# Patient Record
Sex: FEMALE | Race: BLACK OR AFRICAN AMERICAN | Employment: UNEMPLOYED | URBAN - METROPOLITAN AREA
[De-identification: names, ages, dates, MRNs, and addresses within clinical notes are randomized per-mention and may not be internally consistent; named-entity substitution may affect disease eponyms.]

---

## 2021-09-24 ENCOUNTER — HOSPITAL ENCOUNTER (OUTPATIENT)
Dept: PHYSICAL THERAPY | Age: 66
Discharge: HOME | End: 2021-09-24
Payer: MEDICARE

## 2021-09-24 PROCEDURE — 97162 PT EVAL MOD COMPLEX 30 MIN: CPT

## 2021-09-24 NOTE — PROGRESS NOTES
PHYSICAL THERAPY - DAILY TREATMENT NOTE    Patient Name: Theresa Sullivan        Date: 2021  : 1955   YES Patient  Verified  Visit #:      16  Insurance: Payor: [place] MEDICARE / Plan: VA MEDICARE PART A & B / Product Type: Medicare /      In time: 8:30 Out time: 9:30   Total Treatment Time: 60     Medicare/BCBS Time Tracking (below)   Total Timed Codes (min):  5 1:1 Treatment Time:  55     TREATMENT AREA =  Neck pain [M54.2]  Low back pain [M54.5]    SUBJECTIVE    Pain Level (on 0 to 10 scale):   10   Medication Changes/New allergies or changes in medical history, any new surgeries or procedures? NO    If yes, update Summary List   Subjective Functional Status/Changes:  []  No changes reported     See POC          OBJECTIVE    5 (NB) min Self Care: Reviewed diagnosis, prognosis, therapy progression   Rationale:    Improve understanding of injury and therapy to have realistic expectation of therapy to improve compliance/adherence and satisfaction    Billed With/As:   [] TE   [] TA   [] Neuro   [x] Self Care Patient Education: [x] Review HEP    [] Progressed/Changed HEP based on:   [x] Addressed barriers and behaviors     [] Therapeutic Neuroscience Pain Education, metaphor, reframing, contexts.     [x] Sleep Education   [] Body Mechanics [x] Healing Timeframe     [] Self STM with ball at \"the spot\"  [x] Walking Program/Global Activity   [] other:         Other Objective/Functional Measures:    See POC note     Post Treatment Pain Level (on 0 to 10) scale:   5 / 10     ASSESSMENT  Assessment/Changes in Function:     See POC     []  See Progress Note/Recertification   Patient will continue to benefit from skilled PT services to modify and progress therapeutic interventions, address functional mobility deficits, address ROM deficits, address strength deficits, analyze and address soft tissue restrictions, analyze and cue movement patterns, analyze and modify body mechanics/ergonomics and assess and modify postural abnormalities to attain goals per POC. Progress toward goals / Updated goals:    Pt evaluated today.  See POC     PLAN  [x]  Upgrade activities as tolerated YES Continue plan of care   []  Discharge due to :    []  Other:      Therapist: Leonor Gray, PT, DPT, OCS    Date: 9/24/2021 Time: 12:15 PM

## 2021-09-24 NOTE — PROGRESS NOTES
[address] [place] [address] PHYSICAL THERAPY   [facility] [address],[facility], [facility] - Phone: [phone]  Fax: [phone] [facility] / [address] [facility]  Patient Name: Ladarius Cody : 1955   Medical   Diagnosis: Neck pain [M54.2]  Low back pain [M54.5] Treatment Diagnosis: Neck pain [M54.2]  Low back pain [M54.5]   Onset Date: Lifetime. Worsening since May 2021     Referral Source: Marquez Gomez MD Start of Care [facility]): 2021   Prior Hospitalization: See medical history Provider #: [id number]   Prior Level of Function: Walking some, in [address] mostly. Pain and poor function   Comorbidities: Allergies, Anxiety/Panic, arthritis, back pain, depression, DMtype2, HA, HTN, osteoporosis, seizure, TIA, visual impairment. Medications: Verified on Patient Summary List   The Plan of Care and following information is based on the information from the initial evaluation.   ===========================================================================================  Subjective: Reports she has lived with pain her entire life. The entire back side of the body hurts. She had a fall in May 2021 and her body just gave out. Did not hurt herself, but hasn't stood since then. In a WC. Fear of falling. This happened last year too. Did not walk from November to January in 2019 or so. He legs are weak and she has no balance. Says has been in pain in the arms at night, from the neck down. History of PTSD, insest, emotional stress, and chronic pain. Was told her pain and weakness is in her head. Wishes to do Aquatic Therapy because she has done it in the past. She has low back pain and knee pain as well. She spends most of her day lying down. Wants to get better balance and walk. When asked about seizure, pt reports she has had one, but doesn't know the frequency.      Assessment / key information:  [facility]. with signs and symptoms congruent with a diagnosis of chronic persistent pain located at the neck, back and knees primarily. Pt has weakness, poor standing tolerance, poor balance. This affects her QOL and function. Plan will be to improve standing tolerance, stepping tolerance and move towards stair negotiation towards being able to get in and out of the pool to work on pain control therapy. Both parties are agreed to [address] [facility] [address], pt appears motivated for progress. Patient would benefit from skilled intervention to address the above deficits, improve quality of life and return patient to maximum level of prior function. OBJECTIVE: Presenting in manual WC with bilat foot props. LE strength is 3+ on MMT for the knees and hip flexion. UE strength is minimal, yet functional to rise from the chair. Standing from the [facility] is min asssit x1 with set-up required, slow, pt unable to reach full ext of the lumbopelvic area. LEs locked in extension once standing, use of arms on 2WW for support. LBP with duration. Pt can not take a step, per exam.     HEP: Access Code: [email or web address]  URL: [email or web address]  Date: 09/24/2021 Prepared by: Saige Rm    Exercises  Seated Shoulder Flexion Full Range - 2 x daily - 7 x weekly - 1 sets - 10 reps  Seated Cervical Rotation AROM - 1 x daily - 7 x weekly - 1 sets - 10 reps  Standing Backward Shoulder Rolls - 1 x daily - 7 x weekly - 1 sets - 10 reps  Chair Press Ups Forward and Backward - 1 x daily - 7 x weekly - 1 sets - 10 reps  Seated Long Arc Quad - 1 x daily - 7 x weekly - 3 sets - 10 reps  Seated Heel Raise - 1 x daily - 7 x weekly - 3 sets - 10 reps  Seated March - 1 x daily - 7 x weekly - 2 sets - 10 reps    ===========================================================================================  Eval Complexity: History HIGH Complexity :3+ comorbidities / personal factors will impact the outcome/ POC ;  Examination  MEDIUM Complexity : 3 Standardized tests and measures addressing body structure, function, activity limitation and / or participation in recreation ; Presentation HIGH Complexity : Unstable and unpredictable characteristics ; Decision Making MEDIUM Complexity : FOTO score of 26-74; Overall Complexity MEDIUM  Problem List: pain affecting function, decrease ROM, decrease strength, impaired gait/ balance, decrease ADL/ functional abilitiies, decrease activity tolerance and decrease transfer abilities   Treatment Plan may include any combination of the following: Therapeutic exercise, Therapeutic activities, Neuromuscular re-education, Physical agent/modality, Gait/balance training, Manual therapy, Aquatic therapy, Patient education, Self Care training and Functional mobility training  Patient / Family readiness to learn indicated by: asking questions, trying to perform skills and interest  Persons(s) to be included in education: patient (P)  Barriers to Learning/Limitations: None  Measures taken, if barriers to learning:    Patient Goal (s): To walk again   Patient self reported health status: fair  Rehabilitation Potential: fair   Short Term Goals: To be accomplished in  4  weeks  STG1 Pt to report adherence and demonstrate compliance with basic HEP to allow progress between visits  STG2 Pt to report pain <5/10 at worst to allow improved function and QOL  STG3 Pt to sit-stand indep from slight elevated surface to indicate improve home independence. STG4 Pt to stand for 4 minutes with LRAD and intermittent use of 1 UE to indicate ability to stand and complete an ADL in the home   Long Term Goals: To be accomplished in  8  weeks  LTG1 Pt to improve FOTO score to 43+/100 indicating improved function in daily tasks  LTG2 Pt to demonstrate ability to walk with walker for 150 ft to indicate home ambulation status  LTG3 Pt to demonstrate ascend/descend set of 3 stairs to allow access to pool therapy for pain control.     Frequency / Duration: Patient to be seen  2  times per week for 8  weeks  Patient / Caregiver education and instruction: self care and exercises  Therapist Signature: Anayeli Martines PT Date: 1/29/0906   Certification Period: 9/24/21 - 12/23/21 Time: 8:32 AM   ===========================================================================================  I certify that the above Physical Therapy Services are being furnished while the patient is under my care. I agree with the treatment plan and certify that this therapy is necessary. Physician Signature:        Date:       Time:                                        Jose R Valderrama MD  Please sign and return to [facility] AT [facility], [facility]. or you may fax the signed copy to [phone]. Thank you. MEDICAID  To ensure your patient receives the highest quality care and to avoid disruption in therapy please sign and return this plan of care within 21 days. Per Medicaid guidelines if the plan of care is not received within 21 days the patient's care must be put on hold until signed.

## 2021-10-07 ENCOUNTER — HOSPITAL ENCOUNTER (OUTPATIENT)
Dept: PHYSICAL THERAPY | Age: 66
Discharge: HOME | End: 2021-10-07
Payer: MEDICARE

## 2021-10-07 PROCEDURE — 97530 THERAPEUTIC ACTIVITIES: CPT

## 2021-10-07 PROCEDURE — 97535 SELF CARE MNGMENT TRAINING: CPT

## 2021-10-07 PROCEDURE — 97112 NEUROMUSCULAR REEDUCATION: CPT

## 2021-10-07 NOTE — PROGRESS NOTES
PHYSICAL THERAPY - DAILY TREATMENT NOTE    Patient Name: Carol Roland        Date: 10/7/2021  : 1955   yes Patient  Verified  Visit #:   2   of   16  Insurance: Payor: VA MEDICARE / Plan: VA MEDICARE PART A & B / Product Type: Medicare /      In time: 3419 Out time: 100   Total Treatment Time: 45     Medicare/BCBS Time Tracking (below)   Total Timed Codes (min):  45 1:1 Treatment Time:  45     TREATMENT AREA =  Neck pain [M54.2]  Low back pain [M54.50]    SUBJECTIVE  Pain Level (on 0 to 10 scale):  7  / 10   Medication Changes/New allergies or changes in medical history, any new surgeries or procedures?    no  If yes, update Summary List   Subjective Functional Status/Changes:  []  No changes reported     I fell in the bathroom last week trying t get up on my own. OBJECTIVE  15 min Therapeutic Activity:  [x]  See flow sheet   Rationale:      increase ROM, increase strength, improve coordination and improve balance to improve the patients ability to perform functional mobility activities and perform general ADLs with decrease c/o symptoms     22 min Neuromuscular Re-ed: See floe sheet. Rationale:      decrease pain, increase ROM and increase tissue extensibility to improve patient's ability to perform functional mobility activities and perform general ADLs with decrease c/o symptoms  The manual therapy interventions were performed at a separate and distinct time from the therapeutic activities interventions. 8 min Self Care:  Patient educated in spinal stenosis of L4/5 and L5/S1 region.    Rationale:      increase ROM, increase strength, improve coordination and improve balance to improve the patients ability to perform functional mobility activities and perform general ADLs with decrease c/o symptoms     Billed With/As:   [x] TE   [] TA   [] Neuro   [] Self Care Patient Education: [x] Review HEP    [] Progressed/Changed HEP based on:   [] positioning   [] body mechanics   [] transfers [] heat/ice application    [] other:      Other Objective/Functional Measures:    No change in functional measurements today     Post Treatment Pain Level (on 0 to 10) scale:   5  / 10     ASSESSMENT  Assessment/Changes in Function:     Overall good tolerance to all therapeutic interventions for first treatment session. Patient was able to perform standing activities in parallel bars with overall good tolerance and LE strength. No signs of LEs buckling with standing or weight shifting. Use of gait belt for patient safety, but no assistance required for sit <> stand. []  See Progress Note/Recertification   Patient will continue to benefit from skilled PT services to modify and progress therapeutic interventions, address functional mobility deficits, address ROM deficits, address strength deficits, analyze and address soft tissue restrictions and analyze and cue movement patterns to attain remaining goals. Progress toward goals / Updated goals: · Short Term Goals: To be accomplished in  4  weeks  STG1 Pt to report adherence and demonstrate compliance with basic HEP to allow progress between visits  STG2 Pt to report pain <5/10 at worst to allow improved function and QOL  STG3 Pt to sit-stand indep from slight elevated surface to indicate improve home independence. STG4 Pt to stand for 4 minutes with LRAD and intermittent use of 1 UE to indicate ability to stand and complete an ADL in the home  · Long Term Goals: To be accomplished in  8  weeks  LTG1 Pt to improve FOTO score to 43+/100 indicating improved function in daily tasks  LTG2 Pt to demonstrate ability to walk with walker for 150 ft to indicate home ambulation status  LTG3 Pt to demonstrate ascend/descend set of 3 stairs to allow access to pool therapy for pain control.        PLAN  []  Upgrade activities as tolerated yes Continue plan of care   []  Discharge due to :    []  Other:      Therapist: Som Samano PTA    Date: 10/7/2021 Time: 7:20 AM     Future Appointments   Date Time Provider Skylar Vaishali   10/7/2021 12:15 PM Betina Jordanon [address] [facility]   10/13/2021 12:15 PM Betina Rosario [address] [facility]   10/15/2021 12:45 PM Álvaroe Ainsleying,  [address] [facility]   10/19/2021  3:00 PM Donalee Ainsleying,  [address] [facility]   10/21/2021  3:00 PM Álvaroe Ainsleying,  [address] [facility]   10/25/2021  1:45 PM Cachorro Griggs,  [address] [facility]   10/27/2021 12:45 PM Mickey George, PT [id number]

## 2021-10-13 ENCOUNTER — HOSPITAL ENCOUNTER (OUTPATIENT)
Dept: PHYSICAL THERAPY | Age: 66
Discharge: HOME | End: 2021-10-13
Payer: MEDICARE

## 2021-10-13 PROCEDURE — 97110 THERAPEUTIC EXERCISES: CPT

## 2021-10-13 PROCEDURE — 97112 NEUROMUSCULAR REEDUCATION: CPT

## 2021-10-13 PROCEDURE — 97530 THERAPEUTIC ACTIVITIES: CPT

## 2021-10-13 NOTE — PROGRESS NOTES
PHYSICAL THERAPY - DAILY TREATMENT NOTE    Patient Name: Jaz Graham        Date: 10/13/2021  : 1955   yes Patient  Verified  Visit #:   3   of   16  Insurance: Payor: [employer]IC[employer] / Plan: [employer] / Product Type: Managed Care Medicare /      In time: 70 Out time: 100   Total Treatment Time: 45     Medicare/BCBS Time Tracking (below)   Total Timed Codes (min):  45 1:1 Treatment Time:  45     TREATMENT AREA =  Neck pain [M54.2]  Low back pain [M54.50]    SUBJECTIVE  Pain Level (on 0 to 10 scale):  5  / 10   Medication Changes/New allergies or changes in medical history, any new surgeries or procedures?    no  If yes, update Summary List   Subjective Functional Status/Changes:  []  No changes reported     Reports feeling very heavy and tired after last treatment session.           OBJECTIVE  15 min Therapeutic Exercise:  [x]  See flow sheet   Rationale:      increase ROM, increase strength, improve coordination and improve balance to improve the patients ability to perform functional mobility activities and perform general ADLs with decrease c/o symptoms      15 min Therapeutic Activity: [x]  See flow sheet   Rationale:    increase ROM, increase strength, improve coordination and improve balance to improve the patients ability to perform functional mobility activities and perform general ADLs with decrease c/o symptoms     15 min Neuromuscular Re-ed: [x]  See flow sheet   Rationale:    increase ROM, increase strength, improve coordination and improve balance to improve the patients ability to perform functional mobility activities and perform general ADLs with decrease c/o symptoms       Billed With/As:   [x] TE   [] TA   [] Neuro   [] Self Care Patient Education: [x] Review HEP    [] Progressed/Changed HEP based on:   [] positioning   [] body mechanics   [] transfers   [] heat/ice application    [] other:      Other Objective/Functional Measures:    Increase level of therx to improve strength of (B) LE to standing tolerance. Post Treatment Pain Level (on 0 to 10) scale:   4  / 10     ASSESSMENT  Assessment/Changes in Function:     Good tolerance to increase of activity levels in parallel bars. vcing for bringing weight forward over LEs while performing sit <> stand. Able to perform with improved technique and returned good demonstration with decrease use of UEs to pull up.     []  See Progress Note/Recertification   Patient will continue to benefit from skilled PT services to modify and progress therapeutic interventions, address functional mobility deficits, address ROM deficits, address strength deficits and analyze and cue movement patterns to attain remaining goals. Progress toward goals / Updated goals: · Short Term Goals: To be accomplished in  4  weeks  STG1 Pt to report adherence and demonstrate compliance with basic HEP to allow progress between visits  STG2 Pt to report pain <5/10 at worst to allow improved function and QOL  STG3 Pt to sit-stand indep from slight elevated surface to indicate improve home independence. STG4 Pt to stand for 4 minutes with LRAD and intermittent use of 1 UE to indicate ability to stand and complete an ADL in the home  · Long Term Goals: To be accomplished in  8  weeks  LTG1 Pt to improve FOTO score to 43+/100 indicating improved function in daily tasks  LTG2 Pt to demonstrate ability to walk with walker for 150 ft to indicate home ambulation status  LTG3 Pt to demonstrate ascend/descend set of 3 stairs to allow access to pool therapy for pain control.        PLAN  []  Upgrade activities as tolerated yes Continue plan of care   []  Discharge due to :    []  Other:      Therapist: Norma Kinney PTA    Date: 10/13/2021 Time: 6:57 AM     Future Appointments   Date Time Provider Skylar Tom   10/13/2021 12:15 PM Sahara Gill [facility]   10/15/2021 12:45 PM Ana Cristina George PT [facility]   10/19/2021  3:00 PM Cachorro Griggs, PT [facility]   10/21/2021  3:00 PM Cachorro Griggs, PT [facility]   10/25/2021  1:45 PM Cachorro Griggs, PT [facility]   10/27/2021 12:45 PM Mickey George [place], PT [id number]

## 2021-10-15 ENCOUNTER — HOSPITAL ENCOUNTER (OUTPATIENT)
Dept: PHYSICAL THERAPY | Age: 66
Discharge: HOME | End: 2021-10-15
Payer: MEDICARE

## 2021-10-15 PROCEDURE — 97530 THERAPEUTIC ACTIVITIES: CPT

## 2021-10-15 PROCEDURE — 97110 THERAPEUTIC EXERCISES: CPT

## 2021-10-15 PROCEDURE — 97112 NEUROMUSCULAR REEDUCATION: CPT

## 2021-10-15 NOTE — PROGRESS NOTES
PHYSICAL THERAPY - DAILY TREATMENT NOTE    Patient Name: Tyler Moura        Date: 10/15/2021  : 1955   yes Patient  Verified  Visit #:   4   of   16  Insurance: Payor: [employer] MEDICARE / Plan: [address] / Product Type: Managed Care Medicare /      In time: 12:54 Out time: 1:40   Total Treatment Time: 46     Medicare/BCBS Time Tracking (below)   Total Timed Codes (min):  46 1:1 Treatment Time:  46     TREATMENT AREA =  Neck pain [M54.2]  Low back pain [M54.50]    SUBJECTIVE  Pain Level (on 0 to 10 scale):  8  / 10   Medication Changes/New allergies or changes in medical history, any new surgeries or procedures?    no  If yes, update Summary List   Subjective Functional Status/Changes:  []  No changes reported     Says tired and sore. Her body hurts in the evenings and she has trouble sleeping, neck and arm pain.         OBJECTIVE  15 min Therapeutic Exercise:  [x]  See flow sheet   Rationale:      increase ROM, increase strength, improve coordination and improve balance to improve the patients ability to perform functional mobility activities and perform general ADLs with decrease c/o symptoms      16 min Therapeutic Activity: [x]  See flow sheet   Rationale:    increase ROM, increase strength, improve coordination and improve balance to improve the patients ability to perform functional mobility activities and perform general ADLs with decrease c/o symptoms     15 min Neuromuscular Re-ed: [x]  See flow sheet   Rationale:    increase ROM, increase strength, improve coordination and improve balance to improve the patients ability to perform functional mobility activities and perform general ADLs with decrease c/o symptoms       Billed With/As:   [x] TE   [] TA   [] Neuro   [] Self Care Patient Education: [x] Review HEP    [] Progressed/Changed HEP based on:   [] positioning   [] body mechanics   [] transfers   [] heat/ice application    [] other:      Other Objective/Functional Measures:    Standing in //bars: 1', 3', 6'30\"     Post Treatment Pain Level (on 0 to 10) scale:   5 / 10     ASSESSMENT  Assessment/Changes in Function:     Pt presenting with low mood upon entry to clinic and good mood post session. Able to work standing time increases today, third attempt at 6 min 30 sec. Added weight shift with heel lift (prance-in-place) with good result and instructed pt for home use of this drills. Pt continues to have fear of falling behavior. This was verbally addressed and will continue POC as able. []  See Progress Note/Recertification   Patient will continue to benefit from skilled PT services to modify and progress therapeutic interventions, address functional mobility deficits, address ROM deficits, address strength deficits and analyze and cue movement patterns to attain remaining goals. Progress toward goals / Updated goals: · Short Term Goals: To be accomplished in  4  weeks  STG1 Pt to report adherence and demonstrate compliance with basic HEP to allow progress between visits  STG2 Pt to report pain <5/10 at worst to allow improved function and QOL  STG3 Pt to sit-stand indep from slight elevated surface to indicate improve home independence. STG4 Pt to stand for 4 minutes with LRAD and intermittent use of 1 UE to indicate ability to stand and complete an ADL in the home  · Long Term Goals: To be accomplished in  8  weeks  LTG1 Pt to improve FOTO score to 43+/100 indicating improved function in daily tasks  LTG2 Pt to demonstrate ability to walk with walker for 150 ft to indicate home ambulation status  LTG3 Pt to demonstrate ascend/descend set of 3 stairs to allow access to pool therapy for pain control.        PLAN  []  Upgrade activities as tolerated yes Continue plan of care   []  Discharge due to :    []  Other:      Therapist: Gene Terrazas, PT    Date: 10/15/2021 Time: 6:57 AM     Future Appointments   Date Time Provider Skylar Tom 10/19/2021  3:00 PM Hamilton Sanders, PT  [facility]   10/21/2021  3:00 PM Hamilton Sanders, PT  [facility]   10/25/2021  1:45 PM Hamilton Sanders, PT  [facility]   10/27/2021 12:45 PM Curly George, PT [id number]

## 2021-10-19 ENCOUNTER — HOSPITAL ENCOUNTER (OUTPATIENT)
Dept: PHYSICAL THERAPY | Age: 66
Discharge: HOME | End: 2021-10-19
Payer: MEDICARE

## 2021-10-19 PROCEDURE — 97110 THERAPEUTIC EXERCISES: CPT

## 2021-10-19 PROCEDURE — 97116 GAIT TRAINING THERAPY: CPT

## 2021-10-19 PROCEDURE — 97530 THERAPEUTIC ACTIVITIES: CPT

## 2021-10-19 NOTE — PROGRESS NOTES
PHYSICAL THERAPY - DAILY TREATMENT NOTE    Patient Name: China Singh        Date: 10/19/2021  : 1955   yes Patient  Verified  Visit #:   5   of   16  Insurance: Payor: [employer] MEDICARE / Plan: [address] / Product Type: Managed Care Medicare /      In time: 3:00 Out time: 3:40   Total Treatment Time: 40     Medicare/[facility] Time Tracking (below)   Total Timed Codes (min):  40 1:1 Treatment Time:  40     TREATMENT AREA =  Neck pain [M54.2]  Low back pain [M54.50]    SUBJECTIVE  Pain Level (on 0 to 10 scale):  5  / 10   Medication Changes/New allergies or changes in medical history, any new surgeries or procedures?    no  If yes, update Summary List   Subjective Functional Status/Changes:  []  No changes reported     Says she did fall out of her chair while doing. Home chores. Says she is wondering why the knee is hurting. She reports sore after last session, in the muscles. OBJECTIVE  15 min Therapeutic Exercise:  [x]  See flow sheet   Rationale:      increase ROM, increase strength, improve coordination and improve balance to improve the patients ability to perform functional mobility activities and perform general ADLs with decrease c/o symptoms      16 min Therapeutic Activity: [x]  See flow sheet   Rationale:    increase ROM, increase strength, improve coordination and improve balance to improve the patients ability to perform functional mobility activities and perform general ADLs with decrease c/o symptoms     10 min Gait Trainin feet with //barsdevice on level surfaces with CGA level of assistance, WC follwing, gaitbelt. Rationale: To improve ambulation safety and efficiency in order to improve patient's ability to safely ambulate at home for self care.         Billed With/As:   [x] TE   [] TA   [] Neuro   [] Self Care Patient Education: [x] Review HEP    [] Progressed/Changed HEP based on:   [] positioning   [] body mechanics   [] transfers   [] heat/ice application    [] other:      Other Objective/Functional Measures:    Stood with alt UE reaches. Walking in //bars, coaching for step length. Pt using 2 UE and rigid stepping, but safe. Post Treatment Pain Level (on 0 to 10) scale:   5 / 10     ASSESSMENT  Assessment/Changes in Function:     Able to progress standing and able to walk in clinic today with coaching. Continue POC. []  See Progress Note/Recertification   Patient will continue to benefit from skilled PT services to modify and progress therapeutic interventions, address functional mobility deficits, address ROM deficits, address strength deficits and analyze and cue movement patterns to attain remaining goals. Progress toward goals / Updated goals: · Short Term Goals: To be accomplished in  4  weeks  STG1 Pt to report adherence and demonstrate compliance with basic HEP to allow progress between visits  STG2 Pt to report pain <5/10 at worst to allow improved function and QOL  STG3 Pt to sit-stand indep from slight elevated surface to indicate improve home independence. STG4 Pt to stand for 4 minutes with LRAD and intermittent use of 1 UE to indicate ability to stand and complete an ADL in the home  · Long Term Goals: To be accomplished in  8  weeks  LTG1 Pt to improve FOTO score to 43+/100 indicating improved function in daily tasks  LTG2 Pt to demonstrate ability to walk with walker for 150 ft to indicate home ambulation status  LTG3 Pt to demonstrate ascend/descend set of 3 stairs to allow access to pool therapy for pain control.        PLAN  []  Upgrade activities as tolerated yes Continue plan of care   []  Discharge due to :    []  Other:      Therapist: Jackelin Penaloza, PT    Date: 10/19/2021 Time: 6:57 AM     Future Appointments   Date Time Provider Skylar Tom   10/21/2021  3:00 PM Lucia Brannon, [facility] [address]   10/25/2021  1:45 PM Lucia Brannon  [address] [facility]   10/27/2021 12:45 PM Ok Asp, PT [facility]

## 2021-10-21 ENCOUNTER — APPOINTMENT (OUTPATIENT)
Dept: PHYSICAL THERAPY | Age: 66
End: 2021-10-21
Payer: MEDICARE

## 2021-10-25 ENCOUNTER — HOSPITAL ENCOUNTER (OUTPATIENT)
Dept: PHYSICAL THERAPY | Age: 66
Discharge: HOME | End: 2021-10-25
Payer: MEDICARE

## 2021-10-25 PROCEDURE — 97110 THERAPEUTIC EXERCISES: CPT

## 2021-10-25 PROCEDURE — 97116 GAIT TRAINING THERAPY: CPT

## 2021-10-25 PROCEDURE — 97530 THERAPEUTIC ACTIVITIES: CPT

## 2021-10-25 NOTE — PROGRESS NOTES
PHYSICAL THERAPY - DAILY TREATMENT NOTE    Patient Name: Jennifer Traore        Date: 10/25/2021  : 1955   yes Patient  Verified  Visit #:   6   of   16  Insurance: Payor: [employer] MEDICARE / Plan: [address] / Product Type: Managed Care Medicare /      In time: 1:45 Out time: 2:25   Total Treatment Time: 40     Medicare/[facility] Time Tracking (below)   Total Timed Codes (min):  40 1:1 Treatment Time:  40     TREATMENT AREA =  Neck pain [M54.2]  Low back pain [M54.50]    SUBJECTIVE  Pain Level (on 0 to 10 scale):  5  / 10   Medication Changes/New allergies or changes in medical history, any new surgeries or procedures?    no  If yes, update Summary List   Subjective Functional Status/Changes:  []  No changes reported     Says been trying to do more standing at home. Says she has a neurologist apt. OBJECTIVE  15 min Therapeutic Exercise:  [x]  See flow sheet   Rationale:      increase ROM, increase strength, improve coordination and improve balance to improve the patients ability to perform functional mobility activities and perform general ADLs with decrease c/o symptoms      15 min Therapeutic Activity: [x]  See flow sheet   Rationale:    increase ROM, increase strength, improve coordination and improve balance to improve the patients ability to perform functional mobility activities and perform general ADLs with decrease c/o symptoms     10 min Gait Traininft, 67 feet with //barsdevice, then 2WW on level surfaces with CGA level of assistance, gaitbelt. Rationale: To improve ambulation safety and efficiency in order to improve patient's ability to safely ambulate at home for self care.         Billed With/As:   [x] TE   [] TA   [] Neuro   [] Self Care Patient Education: [x] Review HEP    [] Progressed/Changed HEP based on:   [] positioning   [] body mechanics   [] transfers   [] heat/ice application    [] other:      Other Objective/Functional Measures:    Stood with alt UE reaches. Walking in 2WW, coaching for step length. Pt using 2 UE and rigid stepping, but safe. Step taps standing in 2WW at 4 inch step for 3'30\"     Post Treatment Pain Level (on 0 to 10) scale:   5 / 10     ASSESSMENT  Assessment/Changes in Function:     PN today. Significant gains seen. Pt giving good effort in session. Pt continues with kinesiophobia and fear-of-falling behavior. Pt presents with physical capabilities, yet functional limitations. Possible cognitive-emotional link at this time, unclear. Recommending pt follow-up with her doctor regarding. Continue POC toward improved standing, walking and stepping. []  See Progress Note/Recertification   Patient will continue to benefit from skilled PT services to modify and progress therapeutic interventions, address functional mobility deficits, address ROM deficits, address strength deficits and analyze and cue movement patterns to attain remaining goals. Progress toward goals / Updated goals: · Short Term Goals: To be accomplished in  4  weeks  STG1 Pt to report adherence and demonstrate compliance with basic HEP to allow progress between visits Status: Progressing  STG2 Pt to report pain <5/10 at worst to allow improved function and QOL  STG3 Pt to sit-stand indep from slight elevated surface to indicate improve home independence. Status: Pulls or pushes up with UEs. Mod indep. STG4 Pt to stand for 4 minutes with LRAD and intermittent use of 1 UE to indicate ability to stand and complete an ADL in the home: Status: met 6.5 minutes  · Long Term Goals: To be accomplished in  8  weeks  LTG1 Pt to improve FOTO score to 43+/100 indicating improved function in daily tasks Status: 31/100 (regression from 33/100)  LTG2 Pt to demonstrate ability to walk with walker for 150 ft to indicate home ambulation status Status: //bar walking 16 ft.    LTG3 Pt to demonstrate ascend/descend set of 3 stairs to allow access to pool therapy for pain control.        PLAN  []  Upgrade activities as tolerated yes Continue plan of care   []  Discharge due to :    []  Other:      Therapist: Fran Rodriguez PT    Date: 10/25/2021 Time: 6:57 AM     Future Appointments   Date Time Provider Skylar Tom   10/27/2021 12:45 PM Josafat George, PT [id number]

## 2021-10-25 NOTE — PROGRESS NOTES
[address],[place], [address] - Phone: [phone]  Fax: [phone] OF CARE/RECERTIFICATION FOR PHYSICAL THERAPY          Patient Name: Brandon Curiel : 1955   Treatment/Medical Diagnosis: Neck pain [M54.2]  Low back pain [M54.50]   Onset Date: Lifetime, worse since May 2021    Referral Source: Jacolyn Canavan, MD Start of [facility]): 21   Prior Hospitalization: See Medical History Provider #: [id number]   Prior Level of Function: Walking some, in [address] mostly. Pain and poor function   Comorbidities: Allergies, Anxiety/Panic, arthritis, back pain, depression, DMtype2, HA, HTN, osteoporosis, seizure, TIA, visual impairment. Medications: Verified on Patient Summary List   Visits from [facility]: 6 Missed Visits: 1       Goal/Measure of Progress Goal Met? 1. STG1 Pt to report adherence and demonstrate compliance with basic HEP to allow progress between visits   Status at last Eval: Initiated Current Status: Progressing Progressing   2. Pt to report pain <5/10 at worst to allow improved function and QOL   Status at last Eval: 5/10 always Current Status: 5/10 always yes   3. Pt to stand for 4 minutes with LRAD and intermittent use of 1 UE to indicate ability to stand and complete an ADL in the home   Status at last Eval: 0 minutes Current Status: 6'30\" yes      Key Functional Changes/Progress: Significant gains seen. Pt giving good effort in session. Pt able to walk 67 ft with 2WW with supervision/CGA. Pt continues with kinesiophobia and fear-of-falling behavior. Pt presents with physical capabilities, yet functional limitations. Possible cognitive-emotional link at this time, unclear. Recommending pt follow-up with her doctor regarding. Gait is safe, yet slow and robotic at this time. Continue POC toward improved standing, walking and stepping.      Problem List: pain affecting function, decrease ROM, decrease strength, impaired gait/ balance, decrease ADL/ functional abilitiies and decrease activity tolerance   Treatment Plan may include any combination of the following: Therapeutic exercise, Therapeutic activities, Neuromuscular re-education, Physical agent/modality, Gait/balance training, Manual therapy, Patient education, Self Care training and Functional mobility training     Patient Goal(s) has been updated and includes:  Unmet goals below and above    Goals for this certification period include and are to be achieved in   4  weeks:    STG3 Pt to sit-stand indep from slight elevated surface to indicate improve home independence. Status: Pulls or pushes up with UEs. Mod indep. LTG1 Pt to improve FOTO score to 43+/100 indicating improved function in daily tasks Status: 31/100 (regression from 33/100)  LTG2 Pt to demonstrate ability to walk with walker for 150 ft to indicate home ambulation status Status: 2ww walking 67 ft. LTG3 Pt to demonstrate ascend/descend set of 3 stairs to allow access to pool therapy for pain control. Frequency / Duration:   Patient to be seen   2   times per week for   4   more weeks. Assessments/Recommendations:   Continue to progress this patient, as able, towards new/stated goals    If you have any questions/comments please contact us directly at [phone]. Thank you for allowing us to assist in the care of your patient.     Therapist Signature: Mark Blackburn PT Date: 43/23/6084   Certification Period:  Reporting Period: 9/24/21 - 12/23/21 9/24/21 - 10/25/21 Time: 2:30 PM   NOTE TO PHYSICIAN:  PLEASE COMPLETE THE ORDERS BELOW AND FAX TO   [facility]: [phone]  If you are unable to process this request in 24 hours please contact our office: [phone]    ___ I have read the above report and request that my patient continue as recommended.   ___ I have read the above report and request that my patient continue therapy with the following changes/special instructions: ________________________________________________   ___ I have read the above report and request that my patient be discharged from therapy.      Physician Signature:        Date:       Time:                                       Espinoza Espinal MD

## 2021-10-27 ENCOUNTER — HOSPITAL ENCOUNTER (OUTPATIENT)
Dept: PHYSICAL THERAPY | Age: 66
Discharge: HOME | End: 2021-10-27
Payer: MEDICARE

## 2021-10-27 PROCEDURE — 97530 THERAPEUTIC ACTIVITIES: CPT

## 2021-10-27 PROCEDURE — 97110 THERAPEUTIC EXERCISES: CPT

## 2021-10-27 PROCEDURE — 97116 GAIT TRAINING THERAPY: CPT

## 2021-10-27 NOTE — PROGRESS NOTES
PHYSICAL THERAPY - DAILY TREATMENT NOTE    Patient Name: Mone Rose        Date: 10/27/2021  : 1955   yes Patient  Verified  Visit #:   7      16  Insurance: Payor: [employer] MEDICARE / Plan: [address] / Product Type: Managed Care Medicare /      In time: 12:45 Out time: 1:25   Total Treatment Time: 40     Medicare/[facility] Time Tracking (below)   Total Timed Codes (min):  40 1:1 Treatment Time:  40     TREATMENT AREA =  Neck pain [M54.2]  Low back pain [M54.50]    SUBJECTIVE  Pain Level (on 0 to 10 scale):  7  / 10   Medication Changes/New allergies or changes in medical history, any new surgeries or procedures?    no  If yes, update Summary List   Subjective Functional Status/Changes:  []  No changes reported     Reports she's been down and having a hard time standing at home, her body just won't do it. More pain today, she's not sure why. OBJECTIVE  10 min Therapeutic Exercise:  [x]  See flow sheet   Rationale:      increase ROM, increase strength, improve coordination and improve balance to improve the patients ability to perform functional mobility activities and perform general ADLs with decrease c/o symptoms      15 min Therapeutic Activity: [x]  See flow sheet   Rationale:    increase ROM, increase strength, improve coordination and improve balance to improve the patients ability to perform functional mobility activities and perform general ADLs with decrease c/o symptoms     15 min Gait Trainin ft, 20ft, 67 feet with 2WW on level surfaces with supervision level of assistance, gaitbelt. Rationale: To improve ambulation safety and efficiency in order to improve patient's ability to safely ambulate at home for self care.         Billed With/As:   [x] TE   [] TA   [] Neuro   [] Self Care Patient Education: [x] Review HEP    [] Progressed/Changed HEP based on:   [] positioning   [] body mechanics   [] transfers   [] heat/ice application    [] other: Other Objective/Functional Measures:    Step-ups at 4\" step 10x in //bars     Post Treatment Pain Level (on 0 to 10) scale:   5 / 10     ASSESSMENT  Assessment/Changes in Function:     Able to progress both walking distance and stepping function in session. Pt moving slow. Coached for for full knee extension in stance during gait (vs crouched). Continue to progress pt as able. []  See Progress Note/Recertification   Patient will continue to benefit from skilled PT services to modify and progress therapeutic interventions, address functional mobility deficits, address ROM deficits, address strength deficits and analyze and cue movement patterns to attain remaining goals. Progress toward goals / Updated goals: · Short Term Goals: To be accomplished in  4  weeks  STG1 Pt to report adherence and demonstrate compliance with basic HEP to allow progress between visits Status: Progressing  STG2 Pt to report pain <5/10 at worst to allow improved function and QOL  STG3 Pt to sit-stand indep from slight elevated surface to indicate improve home independence. Status: Pulls or pushes up with UEs. Mod indep. STG4 Pt to stand for 4 minutes with LRAD and intermittent use of 1 UE to indicate ability to stand and complete an ADL in the home: Status: met 6.5 minutes  · Long Term Goals: To be accomplished in  8  weeks  LTG1 Pt to improve FOTO score to 43+/100 indicating improved function in daily tasks Status: 31/100 (regression from 33/100)  LTG2 Pt to demonstrate ability to walk with walker for 150 ft to indicate home ambulation status Status: //bar walking 16 ft. LTG3 Pt to demonstrate ascend/descend set of 3 stairs to allow access to pool therapy for pain control.        PLAN  []  Upgrade activities as tolerated yes Continue plan of care   []  Discharge due to :    []  Other:      Therapist: Marie Rivera PT    Date: 10/27/2021 Time: 6:57 AM     Future Appointments   Date Time Provider Skylar Tom 11/4/2021 11:30 AM Corwin Kapoor PTA [facility]   11/9/2021 12:00 PM Brad Murrieta, PT [id number]   11/11/2021 12:30 PM Brad Murrieta, PT [address]   11/15/2021  1:45 PM Dave George, PT [address]

## 2021-11-04 ENCOUNTER — HOSPITAL ENCOUNTER (OUTPATIENT)
Dept: PHYSICAL THERAPY | Age: 66
Discharge: HOME | End: 2021-11-04
Payer: MEDICARE

## 2021-11-04 PROCEDURE — 97110 THERAPEUTIC EXERCISES: CPT

## 2021-11-04 PROCEDURE — 97530 THERAPEUTIC ACTIVITIES: CPT

## 2021-11-04 PROCEDURE — 97116 GAIT TRAINING THERAPY: CPT

## 2021-11-04 NOTE — PROGRESS NOTES
PHYSICAL THERAPY - DAILY TREATMENT NOTE    Patient Name: Malcolm Ghosh        Date: 2021  : 1955   yes Patient  Verified  Visit #:   8   of   16  Insurance: Payor: [employer] MEDICARE / Plan: [address] / Product Type: Managed Care Medicare /      In time: 4709 Out time: 4479   Total Treatment Time: 45     Medicare/[employer] Time Tracking (below)   Total Timed Codes (min):  45 1:1 Treatment Time:  45     TREATMENT AREA =  Neck pain [M54.2]  Low back pain [M54.50]    SUBJECTIVE  Pain Level (on 0 to 10 scale):  4  / 10   Medication Changes/New allergies or changes in medical history, any new surgeries or procedures?    no  If yes, update Summary List   Subjective Functional Status/Changes:  []  No changes reported     I have some pain along my lower back. But I have been walking with my walker at home some now too. OBJECTIVE  20 min Therapeutic Exercise:  [x]  See flow sheet   Rationale:      increase ROM, increase strength, improve coordination and improve balance to improve the patients ability to perform functional mobility activities and perform general ADLs with decrease c/o symptoms       15 min Therapeutic Activity: [x]  See flow sheet   Rationale:    increase ROM, increase strength, improve coordination and improve balance to improve the patients ability to perform functional mobility activities and perform general ADLs with decrease c/o symptoms      10 min Gait Trainin, 50, 50 feet with _RW__ device on level surfaces with gait belt for safety. Rationale: To improve ambulation safety and efficiency in order to improve patient's ability to safely ambulate at home for self care.     Billed With/As:   [x] TE   [] TA   [] Neuro   [] Self Care Patient Education: [x] Review HEP    [] Progressed/Changed HEP based on:   [] positioning   [] body mechanics   [] transfers   [] heat/ice application    [] other:      Other Objective/Functional Measures:    Step-ups at 4\" step 10x in //bars: slightly reduced strength in (L) LE vs (R) requiring vcing to full extend (L) knee. Post Treatment Pain Level (on 0 to 10) scale:   3  / 10     ASSESSMENT  Assessment/Changes in Function:       continues to progress with both walking distance and stepping function in session. Pt moving slowly and with (B) knee flexion requiring vcing for full knee extension in stance during gait. Overall improved tolerance to walking activity   []  See Progress Note/Recertification   Patient will continue to benefit from skilled PT services to modify and progress therapeutic interventions, address functional mobility deficits, address ROM deficits, address strength deficits and analyze and cue movement patterns to attain remaining goals. Progress toward goals / Updated goals: · Short Term Goals: To be accomplished in  4  weeks  STG1 Pt to report adherence and demonstrate compliance with basic HEP to allow progress between visits Status: Progressing  STG2 Pt to report pain <5/10 at worst to allow improved function and QOL  STG3 Pt to sit-stand indep from slight elevated surface to indicate improve home independence. Status: Pulls or pushes up with UEs. Mod indep. STG4 Pt to stand for 4 minutes with LRAD and intermittent use of 1 UE to indicate ability to stand and complete an ADL in the home: Status: met 6.5 minutes  · Long Term Goals: To be accomplished in  8  weeks  LTG1 Pt to improve FOTO score to 43+/100 indicating improved function in daily tasks Status: 31/100 (regression from 33/100)  LTG2 Pt to demonstrate ability to walk with walker for 150 ft to indicate home ambulation status Status: //bar walking 16 ft. LTG3 Pt to demonstrate ascend/descend set of 3 stairs to allow access to pool therapy for pain control.      PLAN  []  Upgrade activities as tolerated yes Continue plan of care   []  Discharge due to :    []  Other:      Therapist: Nita Truong, AICHA    Date: 11/4/2021 Time: 7:10 AM     Future Appointments   Date Time Provider Skylar Tom   11/4/2021 11:30 AM Na Fitting [facility]   11/9/2021 12:00 PM Genny Hernandez, [facility]   11/11/2021 12:30 PM Genny Hernandez, [facility] [address]   11/15/2021  1:45 PM Paresh George, PT [address]

## 2021-11-09 ENCOUNTER — HOSPITAL ENCOUNTER (OUTPATIENT)
Dept: PHYSICAL THERAPY | Age: 66
Discharge: HOME | End: 2021-11-09
Payer: MEDICARE

## 2021-11-09 PROCEDURE — 97110 THERAPEUTIC EXERCISES: CPT

## 2021-11-09 PROCEDURE — 97530 THERAPEUTIC ACTIVITIES: CPT

## 2021-11-09 PROCEDURE — 97116 GAIT TRAINING THERAPY: CPT

## 2021-11-09 NOTE — PROGRESS NOTES
PHYSICAL THERAPY - DAILY TREATMENT NOTE    Patient Name: China Singh        Date: 2021  : 1955   yes Patient  Verified  Visit #:      16  Insurance: Payor: [employer] MEDICARE / Plan: [address] / Product Type: Managed Care Medicare /      In time: 12:00 Out time: 1245   Total Treatment Time: 45     Medicare/[facility] Time Tracking (below)   Total Timed Codes (min):  45 1:1 Treatment Time:  45     TREATMENT AREA =  Neck pain [M54.2]  Other low back pain [M54.59]    SUBJECTIVE  Pain Level (on 0 to 10 scale):   10   Medication Changes/New allergies or changes in medical history, any new surgeries or procedures?    no  If yes, update Summary List   Subjective Functional Status/Changes:  []  No changes reported     Reports she uses her other WC in the home as a walker to push it. Reports she is feeling down this week. More down than normal. She has started writing again, and thinks that will help. OBJECTIVE  15 min Therapeutic Exercise:  [x]  See flow sheet   Rationale:      increase ROM, increase strength, improve coordination and improve balance to improve the patients ability to perform functional mobility activities and perform general ADLs with decrease c/o symptoms       15 min Therapeutic Activity: [x]  See flow sheet   Rationale:    increase ROM, increase strength, improve coordination and improve balance to improve the patients ability to perform functional mobility activities and perform general ADLs with decrease c/o symptoms      15 min Gait Trainin ft 2WW, 40 ft SPC, 40 ft no AD on level surfaces with gait belt for safety. Rationale: To improve ambulation safety and efficiency in order to improve patient's ability to safely ambulate at home for self care.     Billed With/As:   [x] TE   [] TA   [] Neuro   [] Self Care Patient Education: [x] Review HEP    [] Progressed/Changed HEP based on:   [] positioning   [] body mechanics [] transfers   [] heat/ice application    [] other:      Other Objective/Functional Measures:    Sit-stands, slight elevated hi-lo, no UE. Cues for forward head and hands. Giat: 2WW->SPC->nothing.: Quality, slow, trendelenburg noted, slow, shortened step length. Stairs: up/down 4 stairs in clinic. Supervision and cues. Post Treatment Pain Level (on 0 to 10) scale:   3  / 10     ASSESSMENT  Assessment/Changes in Function:     Continued to progress walking and standing and transfer tolerance. []  See Progress Note/Recertification   Patient will continue to benefit from skilled PT services to modify and progress therapeutic interventions, address functional mobility deficits, address ROM deficits, address strength deficits and analyze and cue movement patterns to attain remaining goals. Progress toward goals / Updated goals: · Short Term Goals: To be accomplished in  4  weeks  STG1 Pt to report adherence and demonstrate compliance with basic HEP to allow progress between visits Status: Progressing  STG2 Pt to report pain <5/10 at worst to allow improved function and QOL  STG3 Pt to sit-stand indep from slight elevated surface to indicate improve home independence. Status: Pulls or pushes up with UEs. Mod indep. STG4 Pt to stand for 4 minutes with LRAD and intermittent use of 1 UE to indicate ability to stand and complete an ADL in the home: Status: met 6.5 minutes  · Long Term Goals: To be accomplished in  8  weeks  LTG1 Pt to improve FOTO score to 43+/100 indicating improved function in daily tasks Status: 31/100 (regression from 33/100)  LTG2 Pt to demonstrate ability to walk with walker for 150 ft to indicate home ambulation status Status: MET  LTG3 Pt to demonstrate ascend/descend set of 3 stairs to allow access to pool therapy for pain control.      PLAN  []  Upgrade activities as tolerated yes Continue plan of care   []  Discharge due to :    []  Other:      Therapist: Anali Gaston, PT Date: 11/9/2021 Time: 7:10 AM     Future Appointments   Date Time Provider Skylar Tom   11/9/2021 12:00 PM Laurent Khan [facility]   11/11/2021 12:30 PM Shasta Charles, PT [id number]   11/15/2021  1:45 PM Dani George, PT [id number]

## 2021-11-11 ENCOUNTER — HOSPITAL ENCOUNTER (OUTPATIENT)
Dept: PHYSICAL THERAPY | Age: 66
Discharge: HOME | End: 2021-11-11
Payer: MEDICARE

## 2021-11-11 PROCEDURE — 97112 NEUROMUSCULAR REEDUCATION: CPT

## 2021-11-11 PROCEDURE — 97110 THERAPEUTIC EXERCISES: CPT

## 2021-11-11 PROCEDURE — 97530 THERAPEUTIC ACTIVITIES: CPT

## 2021-11-11 NOTE — PROGRESS NOTES
PHYSICAL THERAPY - DAILY TREATMENT NOTE    Patient Name: Vernon Mancuso        Date: 2021  : 1955   yes Patient  Verified  Visit #:   10  of   16  Insurance: Payor: [employer] / Plan: [address] / Product Type: Managed Care Medicare /      In time: 12:30 Out time: 1:15   Total Treatment Time: 45     Medicare/[employer]BS Time Tracking (below)   Total Timed Codes (min):  45 1:1 Treatment Time:  45     TREATMENT AREA =  Neck pain [M54.2]  Other low back pain [M54.59]    SUBJECTIVE  Pain Level (on 0 to 10 scale):  0 / 10   Medication Changes/New allergies or changes in medical history, any new surgeries or procedures?    no  If yes, update Summary List   Subjective Functional Status/Changes:  []  No changes reported     Pt reports she will have Neurologist apt tomorrow and will discuss scans. Says she just doesn't want to be out or do anything. Depressed. Says not really having too much pain, stiff and tired. Not sleeping well. Got a new walker yesterday, 4ww.           OBJECTIVE  15 min Therapeutic Exercise:  [x]  See flow sheet   Rationale:      increase ROM, increase strength, improve coordination and improve balance to improve the patients ability to perform functional mobility activities and perform general ADLs with decrease c/o symptoms       15 min Therapeutic Activity: [x]  See flow sheet   Rationale:    increase ROM, increase strength, improve coordination and improve balance to improve the patients ability to perform functional mobility activities and perform general ADLs with decrease c/o symptoms      15 min Neuromuscular Re-ed: [x]  See flow sheet   Rationale:    improve coordination, improve balance and increase proprioception to improve the patients ability to stand unsupported    Billed With/As:   [x] TE   [] TA   [] Neuro   [] Self Care Patient Education: [x] Review HEP    [] Progressed/Changed HEP based on:   [] positioning   [] body mechanics   [] transfers   [] heat/ice application    [] other:      Other Objective/Functional Measures:    Sit-stands, slight elevated hi-lo, no UE. Less cues. Post Treatment Pain Level (on 0 to 10) scale:   0  / 10     ASSESSMENT  Assessment/Changes in Function:     Less cues today, yet gave instruction for completion of drills, used less coaching/motivational remarks, more pt lead to form and follow-through. Pt still requires cues for initiation, demo, VCs.      []  See Progress Note/Recertification   Patient will continue to benefit from skilled PT services to modify and progress therapeutic interventions, address functional mobility deficits, address ROM deficits, address strength deficits and analyze and cue movement patterns to attain remaining goals. Progress toward goals / Updated goals: · Short Term Goals: To be accomplished in  4  weeks  STG1 Pt to report adherence and demonstrate compliance with basic HEP to allow progress between visits Status: Progressing  STG2 Pt to report pain <5/10 at worst to allow improved function and QOL  STG3 Pt to sit-stand indep from slight elevated surface to indicate improve home independence. Status: Pulls or pushes up with UEs. Mod indep. STG4 Pt to stand for 4 minutes with LRAD and intermittent use of 1 UE to indicate ability to stand and complete an ADL in the home: Status: met 6.5 minutes  · Long Term Goals: To be accomplished in  8  weeks  LTG1 Pt to improve FOTO score to 43+/100 indicating improved function in daily tasks Status: 31/100 (regression from 33/100)  LTG2 Pt to demonstrate ability to walk with walker for 150 ft to indicate home ambulation status Status: MET  LTG3 Pt to demonstrate ascend/descend set of 3 stairs to allow access to pool therapy for pain control.      PLAN  []  Upgrade activities as tolerated yes Continue plan of care   []  Discharge due to :    []  Other:      Therapist: Sarahi Castro, PT    Date: 11/11/2021 Time: 7:10 AM Future Appointments   Date Time Provider Skylar Tom   11/15/2021  1:45 PM Coretta Morales [facility]   11/17/2021 11:00 AM Kennedi Ortiz, PT [facility]   11/22/2021  1:45 PM Kennedi Ortiz, PT [facility]   11/26/2021  8:45 AM Ngoc George, PT [id number]

## 2021-11-15 ENCOUNTER — HOSPITAL ENCOUNTER (OUTPATIENT)
Dept: PHYSICAL THERAPY | Age: 66
Discharge: HOME | End: 2021-11-15
Payer: MEDICARE

## 2021-11-15 PROCEDURE — 97110 THERAPEUTIC EXERCISES: CPT

## 2021-11-15 PROCEDURE — 97112 NEUROMUSCULAR REEDUCATION: CPT

## 2021-11-15 PROCEDURE — 97530 THERAPEUTIC ACTIVITIES: CPT

## 2021-11-15 NOTE — PROGRESS NOTES
PHYSICAL THERAPY - DAILY TREATMENT NOTE    Patient Name: Perry Rojas        Date: 11/15/2021  : 1955   yes Patient  Verified  Visit #:      16  Insurance: Payor: [employer] MEDICARE / Plan: [address] / Product Type: Managed Care Medicare /      In time: 1:50 Out time: 2:30   Total Treatment Time: 40     Medicare/BCBS Time Tracking (below)   Total Timed Codes (min):  40 1:1 Treatment Time:  40     TREATMENT AREA =  Neck pain [M54.2]  Other low back pain [M54.59]    SUBJECTIVE  Pain Level (on 0 to 10 scale):  0 / 10   Medication Changes/New allergies or changes in medical history, any new surgeries or procedures?    no  If yes, update Summary List   Subjective Functional Status/Changes:  []  No changes reported     Says went to the wrong office for Neurologist on Friday. Says the left side of her back is itching.  Says she did not sleep last night, woke up at 1 am.          OBJECTIVE  15 min Therapeutic Exercise:  [x]  See flow sheet   Rationale:      increase ROM, increase strength, improve coordination and improve balance to improve the patients ability to perform functional mobility activities and perform general ADLs with decrease c/o symptoms       15 min Therapeutic Activity: [x]  See flow sheet   Rationale:    increase ROM, increase strength, improve coordination and improve balance to improve the patients ability to perform functional mobility activities and perform general ADLs with decrease c/o symptoms      15 min Neuromuscular Re-ed: [x]  See flow sheet   Rationale:    improve coordination, improve balance and increase proprioception to improve the patients ability to stand unsupported    Billed With/As:   [x] TE   [] TA   [] Neuro   [] Self Care Patient Education: [x] Review HEP    [] Progressed/Changed HEP based on:   [] positioning   [] body mechanics   [] transfers   [] heat/ice application    [] other:      Other Objective/Functional Measures:    Stairs: 5x up/dwon 4 stairs. Post Treatment Pain Level (on 0 to 10) scale:   0  / 10     ASSESSMENT  Assessment/Changes in Function:     Started session with sitting lumbar AROM followed by standing drills and stepping drills. []  See Progress Note/Recertification   Patient will continue to benefit from skilled PT services to modify and progress therapeutic interventions, address functional mobility deficits, address ROM deficits, address strength deficits and analyze and cue movement patterns to attain remaining goals. Progress toward goals / Updated goals: · Short Term Goals: To be accomplished in  4  weeks  STG1 Pt to report adherence and demonstrate compliance with basic HEP to allow progress between visits Status: Progressing  STG2 Pt to report pain <5/10 at worst to allow improved function and QOL  STG3 Pt to sit-stand indep from slight elevated surface to indicate improve home independence. Status: Pulls or pushes up with UEs. Mod indep. STG4 Pt to stand for 4 minutes with LRAD and intermittent use of 1 UE to indicate ability to stand and complete an ADL in the home: Status: met 6.5 minutes  · Long Term Goals: To be accomplished in  8  weeks  LTG1 Pt to improve FOTO score to 43+/100 indicating improved function in daily tasks Status: 31/100 (regression from 33/100)  LTG2 Pt to demonstrate ability to walk with walker for 150 ft to indicate home ambulation status Status: MET  LTG3 Pt to demonstrate ascend/descend set of 3 stairs to allow access to pool therapy for pain control.      PLAN  []  Upgrade activities as tolerated yes Continue plan of care   []  Discharge due to :    []  Other:      Therapist: Lenora Mendoza PT    Date: 11/15/2021 Time: 7:10 AM     Future Appointments   Date Time Provider Skylar Tom   11/17/2021 11:00 AM Selene Meng, PT [facility] [phone]   11/22/2021  1:45 PM Selene Meng, PT [facility]   11/26/2021  8:45 AM Matthew George, [facility]

## 2021-11-17 ENCOUNTER — APPOINTMENT (OUTPATIENT)
Dept: PHYSICAL THERAPY | Age: 66
End: 2021-11-17
Payer: MEDICARE

## 2021-11-22 ENCOUNTER — HOSPITAL ENCOUNTER (OUTPATIENT)
Dept: PHYSICAL THERAPY | Age: 66
Discharge: HOME | End: 2021-11-22
Payer: MEDICARE

## 2021-11-22 PROCEDURE — 97116 GAIT TRAINING THERAPY: CPT

## 2021-11-22 PROCEDURE — 97530 THERAPEUTIC ACTIVITIES: CPT

## 2021-11-22 NOTE — PROGRESS NOTES
PHYSICAL THERAPY - DAILY TREATMENT NOTE    Patient Name: Jennifer Traore        Date: 2021  : 1955   yes Patient  Verified  Visit #:      16  Insurance: Payor: [employer] MEDICARE / Plan: [address] / Product Type: Managed Care Medicare /      In time: 1:45 Out time: 2:30   Total Treatment Time: 45     Medicare/BCBS Time Tracking (below)   Total Timed Codes (min):  45 1:1 Treatment Time:  45     TREATMENT AREA =  Neck pain [M54.2]  Other low back pain [M54.59]    SUBJECTIVE  Pain Level (on 0 to 10 scale):  0 / 10   Medication Changes/New allergies or changes in medical history, any new surgeries or procedures?    no  If yes, update Summary List   Subjective Functional Status/Changes:  []  No changes reported     Says she's been doing more at home and her partner is cheering her on. OBJECTIVE    25 min Therapeutic Activity: [x]  See flow sheet   Rationale:    increase ROM, increase strength, improve coordination and improve balance to improve the patients ability to perform functional mobility activities and perform general ADLs with decrease c/o symptoms      20 min Gait Trainin feet with NO device on level surfaces with supervision level of assistance, along with stair climbing and big stepping drills   Rationale: To improve ambulation safety and efficiency in order to improve patient's ability to safely ambulate at home for self care. Billed With/As:   [x] TE   [] TA   [] Neuro   [] Self Care Patient Education: [x] Review HEP    [] Progressed/Changed HEP based on:   [] positioning   [] body mechanics   [] transfers   [] heat/ice application    [] other:      Other Objective/Functional Measures:    Presenting to clinic with 4WW.     Walked ~300ft without AD in session, 3'20\"       Post Treatment Pain Level (on 0 to 10) scale:   0  / 10     ASSESSMENT  Assessment/Changes in Function:     NuStep to start followed by //bar stepping drills and walking tolerance. Functional strength and stair climbing completed. Pt with 5x asecend/descend w/out UE support of 4 stairs in clinic, safely. Will PN NV and likely send pt to pool for pain control now that her function is improved. []  See Progress Note/Recertification   Patient will continue to benefit from skilled PT services to modify and progress therapeutic interventions, address functional mobility deficits, address ROM deficits, address strength deficits and analyze and cue movement patterns to attain remaining goals. Progress toward goals / Updated goals: · Short Term Goals: To be accomplished in  4  weeks  STG1 Pt to report adherence and demonstrate compliance with basic HEP to allow progress between visits Status: Progressing  STG2 Pt to report pain <5/10 at worst to allow improved function and QOL  STG3 Pt to sit-stand indep from slight elevated surface to indicate improve home independence. Status: Pulls or pushes up with UEs. Mod indep. STG4 Pt to stand for 4 minutes with LRAD and intermittent use of 1 UE to indicate ability to stand and complete an ADL in the home: Status: met 6.5 minutes  · Long Term Goals: To be accomplished in  8  weeks  LTG1 Pt to improve FOTO score to 43+/100 indicating improved function in daily tasks Status: 31/100 (regression from 33/100)  LTG2 Pt to demonstrate ability to walk with walker for 150 ft to indicate home ambulation status Status: MET  LTG3 Pt to demonstrate ascend/descend set of 3 stairs to allow access to pool therapy for pain control.      PLAN  []  Upgrade activities as tolerated yes Continue plan of care   []  Discharge due to :    []  Other:      Therapist: Page Bowser, PT    Date: 11/22/2021 Time: 7:10 AM     Future Appointments   Date Time Provider Skylar Tom   11/26/2021  8:45 AM Carmelina George, PT [phone]

## 2021-11-26 ENCOUNTER — HOSPITAL ENCOUNTER (OUTPATIENT)
Dept: PHYSICAL THERAPY | Age: 66
Discharge: HOME | End: 2021-11-26
Payer: MEDICARE

## 2021-11-26 PROCEDURE — 97530 THERAPEUTIC ACTIVITIES: CPT

## 2021-11-26 PROCEDURE — 97535 SELF CARE MNGMENT TRAINING: CPT

## 2021-11-26 PROCEDURE — 97164 PT RE-EVAL EST PLAN CARE: CPT

## 2021-11-26 PROCEDURE — 97110 THERAPEUTIC EXERCISES: CPT

## 2021-11-26 NOTE — PROGRESS NOTES
[address],[place], [address] - Phone: [phone]  Fax: [phone] / RECERTIFICATION FOR PHYSICAL THERAPY          Patient Name: Perry Rojas : 1955   Treatment/Medical Diagnosis: Neck pain [M54.2]  Low back pain [M54.50]   Onset Date: Lifetime, worse since May 2021     Referral Source: Espinoza Espinal MD [facility]): 21   Prior Hospitalization: See Medical History Provider #: [id number]   Prior Level of Function: Walking some, in [facility] mostly. Pain and poor function   Comorbidities: Allergies, Anxiety/Panic, arthritis, back pain, depression, DMtype2, HA, HTN, osteoporosis, seizure, TIA, visual impairment. Medications: Verified on Patient Summary List   Visits from [facility]: 13 Missed Visits: 1        Subjective: Reports she had a Seizure yesterday. Was sitting in her chair and felt weird, then blacked out and her partner helped her but she ended up vomiting while passed out. She is tired today. She did not go to Urgent Care or ER \"I never do. \" Reports she will just ask about it on Monday with her neurologist. Says neck pain is still there and she is upset she cannot go to the pool. Presenting to clinic with 4WW.     C/S Rotation: 55 deg bilat, \"stiff\" bilat. Worse to the right. SB: R: 27 deg, L: 20 deg \"tight tight\"     Neck Disability Index (NDI): 51% perceived disability     GOALS MET:  · Short Term Goals: To be accomplished in  4  weeks  STG1 Pt to report adherence and demonstrate compliance with basic HEP to allow progress between visits Status: Progressing  STG2 Pt to report pain <5/10 at worst to allow improved function and QOL Status: 6/10 at the neck. STG3 Pt to sit-stand indep from slight elevated surface to indicate improve home independence. Status: Pulls or pushes up with UEs. Mod indep.   STG4 Pt to stand for 4 minutes with LRAD and intermittent use of 1 UE to indicate ability to stand and complete an ADL in the home: Status: met 6.5 minutes with AD, 3'20\" w/o AD. · Long Term Goals: To be accomplished in  8  weeks  LTG1 Pt to improve FOTO score to 43+/100 indicating improved function in daily tasks Status: 45/100  (was: 31/100) (regression from 33/100)  LTG2 Pt to demonstrate ability to walk with walker for 150 ft to indicate home ambulation status Status: MET  LTG3 Pt to demonstrate ascend/descend set of 3 stairs to allow access to pool therapy for pain control. Status: MET    Key Functional Changes/Progress: Re-Evaluation today. Pt's functional neurologic deficits are largely improved at this time. Neck pain (arthritic type) remains as primary deficit. Patient unable to transition to Aquatic PT due to seizure reporting, as this is a contraindication to pool activity. Pt has made significant gains since [facility], from starting in a WC and unable to stand to coming in using a 4WW and walking 6 minutes. Pt able to go up and down 24 stairs indep, with 16 of them not using UEs for support. Pt able to transfer off typical chair height surface without use of UEs. Overall significant progress made. Neck pain and intermittent LBP reported, along with fluctuations in mood and outlook. POC was to send pt to [facility] for Pain control. Based on current status, will complete 4 more weeks of PT then DC to [facility] and [facility]. Reports neck pain is every day. Worse in the evening, trouble getting to sleep and wakes her up. Reports she can get distracted from it, but she deals with it. Reports 10/10 intensity at night. Will focus on Neck pain to improve QOL and sleep.      Problem List: pain affecting function, decrease ROM, decrease ADL/ functional abilitiies, decrease activity tolerance and decrease flexibility/ joint mobility   Treatment Plan may include any combination of the following: Therapeutic exercise, Therapeutic activities, Neuromuscular re-education, Physical agent/modality, Gait/balance training, Manual therapy, Patient education, Self Care training and Functional mobility training     Patient Goal(s) has been updated and includes:  Neck goals below    Goals for this certification period include and are to be achieved in   4  weeks:  Pt ti improve sleep quality to include 5+ hrs undisturbed sleep to improve QOL. Pt to report neck pain at 6/10 at worst and avg of <3/10 to indicate improved QOL  Pt will improve NDI to <25% perceived disability   Frequency / Duration:   Patient to be seen   2   times per week for   4   more weeks. Assessments/Recommendations:   Continue to progress this patient, as able, towards new/stated goals    If you have any questions/comments please contact us directly at [phone]. Thank you for allowing us to assist in the care of your patient. Therapist Signature: Gene Terrazas PT Date: 96/98/8283   Certification Period:  Reporting Period: 11/26/21 - 2/25/21  10/25/21 - 11/26/21 Time: 9:47 AM   NOTE TO PHYSICIAN:  PLEASE COMPLETE THE ORDERS BELOW AND FAX TO   [facility] Physical Therapy: [phone]  If you are unable to process this request in 24 hours please contact our office: [phone]    ___ I have read the above report and request that my patient continue as recommended.   ___ I have read the above report and request that my patient continue therapy with the following changes/special instructions: ________________________________________________   ___ I have read the above report and request that my patient be discharged from therapy.      Physician Signature:        Date:       Time:                                       Stephen Guerra MD

## 2021-11-26 NOTE — PROGRESS NOTES
PHYSICAL THERAPY - DAILY TREATMENT NOTE    Patient Name: Alex Mittal        Date: 2021  : 1955   yes Patient  Verified  Visit #:   15  of   21  Insurance: Payor: Luciana Agarwal / Plan: [address] / Product Type: Managed Care Medicare /      In time: 8:40 Out time: 9:35   Total Treatment Time: 55     Medicare/BCBS Time Tracking (below)   Total Timed Codes (min):  45 1:1 Treatment Time:  55     TREATMENT AREA =  Neck pain [M54.2]  Other low back pain [M54.59]    SUBJECTIVE  Pain Level (on 0 to 10 scale):  6 / 10  Neck pain   Medication Changes/New allergies or changes in medical history, any new surgeries or procedures?    no  If yes, update Summary List   Subjective Functional Status/Changes:  []  No changes reported     Reports she had a Seizure yesterday. Was sitting in her chair and felt weird, then blacked out and her partner helped her but she ended up vomiting while passed out. She is tired today. She did not go to Urgent Care or ER \"I never do. \" Reports she will just ask about it on Monday with her neurologist. Says neck pain is still there and she is upset she cannot go to the pool. OBJECTIVE  10 min Therapeutic Activity: [x]  See flow sheet   Rationale:    increase ROM, increase strength, improve coordination and improve balance to improve the patients ability to perform functional mobility activities and perform general ADLs with decrease c/o symptoms      15 min Therapeutic Exercise: [x]  See flow sheet   Rationale:    increase ROM and improve coordination to improve the patients ability to improve neck pain and positioning    15 min Self Care: Activity modification, Pt Ed, POC, planning. Rationale:    improve coordination to improve the patients ability to make gains between session    10 min Re-Evaluation: Neck Assessment, closing out of prior FOTO.     Rationale:    Assessment and Re-Evaluation of current status and neck to improve the patients ability to improve QOL and ADLs and sleep. Billed With/As:   [x] TE   [] TA   [] Neuro   [] Self Care Patient Education: [x] Review HEP    [] Progressed/Changed HEP based on:   [] positioning   [] body mechanics   [] transfers   [] heat/ice application    [] other:      Other Objective/Functional Measures:    Presenting to clinic with 4WW. C/S Rotation: 55 deg bilat, \"stiff\" bilat. Worse to the right. SB: R: 27 deg, L: 20 deg \"tight tight\"    Neck Disability Index (NDI): 51% perceived disability     HEP: Access Code: [email or web address]  URL: [email or web address]  Date: 11/26/2021  Prepared by: Joaquin Sky    Exercises  Seated Scapular Retraction - 3 x daily - 7 x weekly - 3 sets - 10 reps  Seated Passive Cervical Retraction - 3 x daily - 7 x weekly - 3 sets - 10 reps  Seated Assisted Cervical Rotation with Towel - 3 x daily - 7 x weekly - 1 sets - 10 reps       Post Treatment Pain Level (on 0 to 10) scale:   0  / 10     ASSESSMENT  Assessment/Changes in Function:     Re-Evaluation today. Pt's functional neurologic deficits are largely improved at this time. Neck pain (arthritic type) remains as primary deficit. Patient unable to transition to Aquatic PT due to seizure reporting, as this is a contraindication to pool activity. Pt has made significant gains since [facility], from starting in a WC and unable to stand to coming in using a 4WW and walking 6 minutes. Pt able to go up and down 24 stairs indep, with 16 of them not using UEs for support. Pt able to transfer off typical chair height surface without use of UEs. Overall significant progress made. Neck pain and intermittent LBP reported, along with fluctuations in mood and outlook. POC was to send pt to Aquatic for Pain control. Based on current status, will complete 4 more weeks of PT then DC to [facility] and community. Reports neck pain is every day. Worse in the evening, trouble getting to sleep and wakes her up.  Reports she can get distracted from it, but she deals with it. Reports 10/10 intensity at night. Will focus on Neck pain to improve QOL and sleep. []  See Progress Note/Recertification   Patient will continue to benefit from skilled PT services to modify and progress therapeutic interventions, address functional mobility deficits, address ROM deficits, address strength deficits and analyze and cue movement patterns to attain remaining goals. Progress toward goals / Updated goals: · Short Term Goals: To be accomplished in  4  weeks  STG1 Pt to report adherence and demonstrate compliance with basic HEP to allow progress between visits Status: Progressing  STG2 Pt to report pain <5/10 at worst to allow improved function and QOL Status: 6/10 at the neck. STG3 Pt to sit-stand indep from slight elevated surface to indicate improve home independence. Status: Pulls or pushes up with UEs. Mod indep. STG4 Pt to stand for 4 minutes with LRAD and intermittent use of 1 UE to indicate ability to stand and complete an ADL in the home: Status: met 6.5 minutes with AD, 3'20\" w/o AD. · Long Term Goals: To be accomplished in  8  weeks  LTG1 Pt to improve FOTO score to 43+/100 indicating improved function in daily tasks Status: 45/100  (was: 31/100) (regression from 33/100)  LTG2 Pt to demonstrate ability to walk with walker for 150 ft to indicate home ambulation status Status: MET  LTG3 Pt to demonstrate ascend/descend set of 3 stairs to allow access to pool therapy for pain control. Status: MET    NEW GOALS:  Pt ti improve sleep quality to include 5+ hrs undisturbed sleep to improve QOL.    Pt to report neck pain at 6/10 at worst and avg of <3/10 to indicate improved QOL  Pt will improve NDI to <25% perceived disability      PLAN  []  Upgrade activities as tolerated yes Continue plan of care   []  Discharge due to :    []  Other:      Therapist: Vahe Conley PT    Date: 11/26/2021 Time: 7:10 AM     Future Appointments Date Time Provider Skylar Tom   11/26/2021  8:45 AM Marissa Cleaning PT [facility]   12/7/2021  2:00 PM Joan Humphrey [facility]   12/9/2021  2:00 PM Joan [facility]   12/13/2021  1:15 PM Joan Edgerton [facility]   12/16/2021  2:00 PM Joan Edgerton [facility]   12/27/2021  1:15 PM Joan [facility]   12/29/2021  2:00 PM Joan Humphrey [id number]

## 2021-12-07 ENCOUNTER — APPOINTMENT (OUTPATIENT)
Dept: PHYSICAL THERAPY | Age: 66
End: 2021-12-07

## 2021-12-09 ENCOUNTER — APPOINTMENT (OUTPATIENT)
Dept: PHYSICAL THERAPY | Age: 66
End: 2021-12-09

## 2021-12-13 ENCOUNTER — APPOINTMENT (OUTPATIENT)
Dept: PHYSICAL THERAPY | Age: 66
End: 2021-12-13

## 2021-12-16 ENCOUNTER — APPOINTMENT (OUTPATIENT)
Dept: PHYSICAL THERAPY | Age: 66
End: 2021-12-16

## 2021-12-17 ENCOUNTER — APPOINTMENT (OUTPATIENT)
Dept: PHYSICAL THERAPY | Age: 66
End: 2021-12-17

## 2021-12-21 ENCOUNTER — APPOINTMENT (OUTPATIENT)
Dept: PHYSICAL THERAPY | Age: 66
End: 2021-12-21

## 2021-12-23 ENCOUNTER — APPOINTMENT (OUTPATIENT)
Dept: PHYSICAL THERAPY | Age: 66
End: 2021-12-23

## 2021-12-27 ENCOUNTER — APPOINTMENT (OUTPATIENT)
Dept: PHYSICAL THERAPY | Age: 66
End: 2021-12-27

## 2021-12-29 ENCOUNTER — APPOINTMENT (OUTPATIENT)
Dept: PHYSICAL THERAPY | Age: 66
End: 2021-12-29

## 2022-01-06 ENCOUNTER — APPOINTMENT (OUTPATIENT)
Dept: PHYSICAL THERAPY | Age: 67
End: 2022-01-06

## 2022-01-10 NOTE — PROGRESS NOTES
[facility] PHYSICAL THERAPY  [address] [place] [address] 201,Autumn Uribe, [address] - Phone: [phone]  Fax: [phone]    DISCHARGE NOTE  Patient Name: Keshia Bryan : 1955   Treatment/Medical Diagnosis: Neck pain [M54.2]  Other low back pain [M54.59]   Referral Source: Rajinder Latif MD     Date of Initial Visit: 21 Attended Visits: 13 Missed Visits: 1       SUMMARY OF TREATMENT  Pt attended only initial evaluation and     12     follow-ups. Pt had made significant gains in therapy from [facility] bound to walking. Last attended visit (21) pt was set for new POC of one month PT. Pt's insurance changed and transportation was not an option for patient. Due to this pt was unable to attend visits. Last note (Progress Note from 21) has objective findings. RECOMMENDATIONS  Discontinue physical therapy. If you have any questions/comments please contact us directly at [phone]. Thank you for allowing us to assist in the care of your patient. Therapist Signature: Robert Leonard PT Date: 1/10/22     Time: 2:48 PM   NOTE TO PHYSICIAN:  Your patient's insurance requires this discharge note be signed and returned. PLEASE COMPLETE THE ORDERS BELOW AND RETURN TO:  [facility] PHYSICAL THERAPY    ___ I have read the above report and request that my patient be discharged from therapy.      Physician Signature:        Date:       Time:    Rajinder Latif MD

## 2022-06-22 ENCOUNTER — TELEPHONE (OUTPATIENT)
Dept: PHYSICAL THERAPY | Age: 67
End: 2022-06-22

## 2022-06-28 ENCOUNTER — HOSPITAL ENCOUNTER (OUTPATIENT)
Dept: PHYSICAL THERAPY | Age: 67
Discharge: HOME | End: 2022-06-28
Payer: MEDICARE

## 2022-06-28 PROCEDURE — 97530 THERAPEUTIC ACTIVITIES: CPT

## 2022-06-28 PROCEDURE — 97162 PT EVAL MOD COMPLEX 30 MIN: CPT

## 2022-06-28 PROCEDURE — 97140 MANUAL THERAPY 1/> REGIONS: CPT

## 2022-06-28 NOTE — PROGRESS NOTES
[facility] - [facility] Srinivas Ledesma Liliane Weller [address]  [phone] fax  Plan of Care/ Statement of Necessity for Physical Therapy Services    Patient name: Ruthy Blanco Start of Care: 2022   Referral source: Raad Stockton MD : 1955    Medical Diagnosis: Spinal enthesopathy, site unspecified [M46.00]  Payor: [address] [place] [address],9D / Plan: [address] / Product Type: [employer] Medicare /  Onset Date:22 Date of script   Treatment Diagnosis: generalized weakness   Prior Hospitalization: see medical history Provider#: [id number]   Medications: Verified on Patient summary List    Comorbidities: Hx: arthritis, HTN (controlled), diabetes, hx of seizure (most recent seizure was last December), heart arrhythmia, Histerectomy, bunionectomy, Pt has a family hx Parkinson's    Prior Level of Function: Pt presents in w/c at time of evaluation as primary means of mobility, retired, lives in a one story home with partner      The Plan of Care and following information is based on the information from the initial evaluation. Assessment/ key information: Pt is a 77 y.o. female who presents with c/o generalized weakness and chronic pain. Pt medical hx includes DM, HTN, hx of seizures, and osteoporosis. Pt reports having a hx of falls with most recent fall occurring two months ago. Functional deficits include: difficulty with walking,difficulty  sleeping, and decreased independence with gait and mobility. Upon exam, Pt exhibited decreased strength, ROM, impaired gait and balance. Pt would benefit from skilled PT to address above deficits to improve Pt's function and ability to return to PLOF without pain or restriction.      Evaluation Complexity History HIGH Complexity :3+ comorbidities / personal factors will impact the outcome/ POC ; Examination MEDIUM Complexity : 3 Standardized tests and measures addressing body structure, function, activity limitation and / or participation in recreation  ;Presentation MEDIUM Complexity : Evolving with changing characteristics  ; Clinical Decision Making MEDIUM Complexity : FOTO score of 26-74  Overall Complexity Rating: MEDIUM  Problem List: pain affecting function, decrease ROM, decrease strength, edema affecting function, impaired gait/ balance, decrease ADL/ functional abilitiies, decrease activity tolerance, decrease flexibility/ joint mobility and decrease transfer abilities   Treatment Plan may include any combination of the following: Therapeutic exercise, Therapeutic activities, Neuromuscular re-education, Physical agent/modality, Gait/balance training, Manual therapy, Aquatic therapy, Patient education, Self Care training, Functional mobility training, Home safety training and Stair training  Patient / Family readiness to learn indicated by: asking questions, trying to perform skills and interest  Persons(s) to be included in education: patient (P)  Barriers to Learning/Limitations: None  Patient Goal (s): \"I would love to be able to walk  Patient Self Reported Health Status: fair  Rehabilitation Potential: good    Short Term Goals: To be accomplished in 1 weeks:  Goal: Pt to be compliant with initial HEP to improve strength and functional mobility  Status at last note/certification: Established and reviewed with Pt  Goal: Pt to initiate aquatics program without increased pain to aid in achievement of all LTGs. Status at last note/certification: N/A  Long Term Goals: To be accomplished in 5 weeks:  Goal: Pt to negotiate pool stairs with no more than 1 UE support and with reciprocal pattern for improved functional mobility  Status at last note/certification: unable to perform  Goal: Pt to ambulate 75 ft with LRAD and Min A for safety for improved independence with functional mobility and gait  Status at last note/certification:  Unable to ambulate at time of evaluation.    Goal: Pt to report < 2/10 pain at worst to increase ease with ADLs. Status at last note/certification: 23/39 pain at worst  Goal: Pt to report FOTO score of 38 to show improved function and quality of life. Status at last note/certification: FOTO 33 pts    Frequency / Duration: Patient to be seen 2 times per week for 5 weeks. Patient/ Caregiver education and instruction: Diagnosis, prognosis, self care, activity modification, brace/ splint application and exercises   [x]  Plan of care has been reviewed with PTA    Certification Period: 6/28/22-7/27/22  Traci Ahn, PT 6/28/2022 10:14 AM  _____________________________________________________________________  I certify that the above Therapy Services are being furnished while the patient is under my care. I agree with the treatment plan and certify that this therapy is necessary.     [de-identified] Signature:____________Date:_________TIME:________     Barry Kramer MD  ** Signature, Date and Time must be completed for valid certification **    Please sign and return to [facility] - [address]  [phone] fax

## 2022-06-28 NOTE — PROGRESS NOTES
PT DAILY TREATMENT NOTE/LUMBAR EVAL     Patient Name: Yolie Bennett  Date:2022  : 1955  [x]  Patient  Verified  Payor: [employer]ICARE / Plan: [address] / Product Type: Managed Care Medicare /    In time:910  Out time:1000  Total Treatment Time (min): 50  Visit #: 1 of 10    Medicare/BCBS Only   Total Timed Codes (min):  35 1:1 Treatment Time:  35     Treatment Area: Spinal enthesopathy, site unspecified [M46.00]     SUBJECTIVE  Pain Level (0-10 scale): 8/10(now), 5/10 (best), 10/10 (worst)   []constant []intermittent []improving []worsening []no change since onset    Any medication changes, allergies to medications, adverse drug reactions, diagnosis change, or new procedure performed?: [x] No    [] Yes (see summary sheet for update)  Subjective functional status/changes:   Pt presents with increased pain and weakness making it hard to get up. Pt reports increased constant shaking. Pt being followed by neurologist. Pt reports she has been in w/c since last May around Mothers Day. Pt reports she can walk short distances at home but she is afraid to use it. Pt reports she had a fall in the last two months.  pt is scheduled to see her PCP. PLOF: Pt presents to PT in w/c at time of evaluation. Limitations to PLOF: weakness, pain, and decreased balance. Mechanism of Injury: chornic in nature for the last year.   Current symptoms/Complaints: weakness, pain, decreased balance  Previous Treatment/Compliance: Pt reports she has had PT in the past  PMHx/Surgical Hx: arthritis, HTN (controlled), diabetes, hx of seizure (most recent seizure was last December), heart arrhythmia, Histerectomy, bunionectomy, Pt has a family hx Parkinson's   Work Hx: Retired due to inability to work  Living Situation: Pt lives at home with partner in a mobile home  Pt Goals: \"I would love to be able to walk\"  Barriers:None  Motivation: pt appears motivated to participate in PT  Substance use: []Alcohol []Tobacco [x]other: Marijuana     OBJECTIVE/EXAMINATION  Activity/Recreational Limitations: walking  Mobility: Pt presents in w/c at time of evaluation  Self Care: Pt able to shower herself using her shower chair, pt reports functionally independent with all ADLs. 15 min [x]Eval                  []Re-Eval       20 min Therapeutic Activity:  []  See flow sheet :   Rationale: increase ROM, increase strength, improve coordination, improve balance and increase proprioception  to improve the patients ability to tolerate ADLs and daily routine    15 min Manual Therapy:  Piriformis release to left side. The manual therapy interventions were performed at a separate and distinct time from the therapeutic activities interventions. Rationale: decrease pain, increase ROM, increase tissue extensibility, decrease trigger points and increase postural awareness to improve pt tolerance for functional mobility. With   [] TE   [] TA   [] neuro   [] other: Patient Education: [x] Review HEP    [] Progressed/Changed HEP based on:   [] positioning   [] body mechanics   [] transfers   [] heat/ice application    [] other:      Other Objective/Functional Measures: Established HEP    Physical Therapy Evaluation - Lumbar Spine     SUBJECTIVE  Chief Complaint: pain, inability to walk, muscle spasms in the left glute, weakness. Mechanism of injury:chronic in nature, worsening recently    Symptoms:  Aggravated by:   [] Bending [] Sitting [] Standing [] Walking   [x] Moving [] Cough [] Sneeze [] Valsalva   [] AM  [] PM  Lying:  [] sup   [] pro   [] sidelying   [] Other:     Eased by:    [] Bending [] Sitting [] Standing [] Walking   [] Moving [] AM  [] PM  Lying: [] sup  [] pro  [] sidelying   [x] Other: sexual activities with partner, gabapentin. General Health:  Red Flags Indicated?  [] Yes    [] No  [x] Yes [] No Recent weight change- Pt has had a recent weight gain since being in w/c   [x] Yes [] No Weakness in legs during walking  [x] Yes [] No Unremitting pain at night  [] Yes [x] No Abdominal pain or problems  [] Yes [x] No Rectal bleeding  [] Yes [x] No Feet more cold or painful in cold weather  [] Yes [x] No Menstrual irregularities  [] Yes [x] No Blood or pain with urination  [] Yes [x] No Dysfunction of bowel or bladder  [] Yes [x] No Recent illness within past 3 weeks (i.e, cold, flu)  [] Yes [x] No Numbness/tingling in buttock/genitalia region      Functional Status  What position do you sleep in?:prone, pt reports that is the only way she can get some sleep. OBJECTIVE    Gait:  [] Normal     [x] Abnormal:Pt unable to ambulate independently during session using w/c as primary means of mobility. Pt reports she does have a walker that she can use at home. Active Movements: [] N/A   [] Too acute   [x] Other: unable to assess secondary to concerns regarding balance. Neuro Screen [] WNL  Myotome/Dermatome/Reflexes:  Comments: Pt reports constant numbness and tingling through BLE with left side worse than the right. Palpation  [] Min  [x] Mod  [] Severe    Location: left piriformis tightness with report of tenderness. Strength   L(0-5) R (0-5) N/T   Hip Flexion (L1,2) 3-/5 3+/5 []   Knee Extension (L3,4) 3-/5 4-/5 []   Ankle Dorsiflexion (L4) 3-/5 3+/5 []   Ankle Plantarflexion (S1) 3-/5 4-/5 []   Knee Flexion (S1,2) 3/5 3+/5 []   Hip abd 2/5 3/5 []     Transfers: Pt able to perform w/c to mat transfer with SBA for safety and BUE support to perform. Pt functionally independent with bed mobility however with increased time to perform.     Pain Level (0-10 scale) post treatment: 5/10    ASSESSMENT/Changes in Function:     Patient will continue to benefit from skilled PT services to modify and progress therapeutic interventions, address functional mobility deficits, address ROM deficits, address strength deficits, analyze and address soft tissue restrictions, analyze and cue movement patterns, analyze and modify body mechanics/ergonomics, assess and modify postural abnormalities, address imbalance/dizziness and instruct in home and community integration to attain remaining goals.      [x]  See Plan of Care  []  See progress note/recertification  []  See Discharge Summary         Progress towards goals / Updated goals:  See POC    PLAN  []  Upgrade activities as tolerated     []  Continue plan of care  []  Update interventions per flow sheet       []  Discharge due to:_  []  Other:_      Cindy Palafox, PT 6/28/2022  9:12 AM

## 2022-06-30 ENCOUNTER — HOSPITAL ENCOUNTER (OUTPATIENT)
Dept: PHYSICAL THERAPY | Age: 67
Discharge: HOME | End: 2022-06-30
Payer: MEDICARE

## 2022-06-30 PROCEDURE — 97113 AQUATIC THERAPY/EXERCISES: CPT

## 2022-07-05 ENCOUNTER — HOSPITAL ENCOUNTER (OUTPATIENT)
Dept: PHYSICAL THERAPY | Age: 67
Discharge: HOME | End: 2022-07-05
Payer: MEDICARE

## 2022-07-05 PROCEDURE — 97113 AQUATIC THERAPY/EXERCISES: CPT

## 2022-07-07 ENCOUNTER — HOSPITAL ENCOUNTER (OUTPATIENT)
Dept: PHYSICAL THERAPY | Age: 67
Discharge: HOME | End: 2022-07-07
Payer: MEDICARE

## 2022-07-07 PROCEDURE — 97113 AQUATIC THERAPY/EXERCISES: CPT

## 2022-07-12 ENCOUNTER — HOSPITAL ENCOUNTER (OUTPATIENT)
Dept: PHYSICAL THERAPY | Age: 67
Discharge: HOME | End: 2022-07-12
Payer: MEDICARE

## 2022-07-12 PROCEDURE — 97113 AQUATIC THERAPY/EXERCISES: CPT

## 2022-07-14 ENCOUNTER — HOSPITAL ENCOUNTER (OUTPATIENT)
Dept: PHYSICAL THERAPY | Age: 67
Discharge: HOME | End: 2022-07-14
Payer: MEDICARE

## 2022-07-14 PROCEDURE — 97113 AQUATIC THERAPY/EXERCISES: CPT

## 2022-07-19 ENCOUNTER — HOSPITAL ENCOUNTER (OUTPATIENT)
Dept: PHYSICAL THERAPY | Age: 67
Discharge: HOME | End: 2022-07-19
Payer: MEDICARE

## 2022-07-19 PROCEDURE — 97113 AQUATIC THERAPY/EXERCISES: CPT

## 2022-07-21 ENCOUNTER — HOSPITAL ENCOUNTER (OUTPATIENT)
Dept: PHYSICAL THERAPY | Age: 67
Discharge: HOME | End: 2022-07-21
Payer: MEDICARE

## 2022-07-21 PROCEDURE — 97113 AQUATIC THERAPY/EXERCISES: CPT

## 2022-07-26 ENCOUNTER — HOSPITAL ENCOUNTER (OUTPATIENT)
Dept: PHYSICAL THERAPY | Age: 67
Discharge: HOME | End: 2022-07-26
Payer: MEDICARE

## 2022-07-26 PROCEDURE — 97113 AQUATIC THERAPY/EXERCISES: CPT

## 2022-07-26 NOTE — PROGRESS NOTES
[facility] - [facility] [address]  [phone] fax    Continued Plan of Care/ Re-certification for Physical Therapy Services      Patient name: Cheryl Sendkay Start of Care: 2022   Referral source: Sherrie Phillip MD : 1955   Medical/Treatment Diagnosis: Other low back pain [M54.59]  Spinal enthesopathy, site unspecified [M46.00]  Payor: Tj Larsen / Plan: [address] / Product Type: Managed Care Medicare /  Onset Date:  22            Date of script   Prior Hospitalization: see medical history Provider#: [id number]   Medications: Verified on Patient Summary List    Comorbidities:  Hx: arthritis, HTN (controlled), diabetes, hx of seizure (most recent seizure was last December), heart arrhythmia, Histerectomy, bunionectomy, Pt has a family hx Parkinson's  Prior Level of Function:Pt presents in w/c at time of evaluation as primary means of mobility, retired, lives in a one story home with partner    Visits from [place] of Care: 9    Missed Visits: 0    The Plan of Care and following information is based on the patient's current status:    Short Term Goals: To be accomplished in 1 weeks:  Goal: Pt to be compliant with initial HEP to improve strength and functional mobility  Status at last note/certification: Established and reviewed with Pt  Current:met  Goal: Pt to initiate aquatics program without increased pain to aid in achievement of all LTGs. Status at last note/certification: N/A  Current: MET;   Long Term Goals:  To be accomplished in 5 weeks:  Goal: Pt to negotiate pool stairs with no more than 1 UE support and with reciprocal pattern for improved functional mobility  Status at last note/certification: unable to perform  Current:progressing Pt able to utilize reciprocal pattern during ascent and descent with B UEs on the hand rails  Goal: Pt to ambulate 75 ft with LRAD and Min A for safety for improved independence with functional mobility and gait  Status at last note/certification:  Unable to ambulate at time of evaluation. Current: met Pt able to ambulate without AD only requiring CGA by therapist.   Goal: Pt to report < 2/10 pain at worst to increase ease with ADLs. Status at last note/certification: 37/37 pain at worst  Current: progressing Worst pain 7/10  Goal: Pt to report FOTO score of 38 to show improved function and quality of life. Status at last note/certification: FOTO 33 pts  Current: progressin point FOTO score. Key functional changes: Pt has attended 9 skilled therapy sessions and has made major improvements in function and strength since the start of therapy. Pt has achieved 2 of her LTGs and was able to ambulate 75 feet without the use of an AD. She is able to negotiate stairs with a reciprocal gait pattern but requires the use of both UEs to improve stability. Her functional deficits include requiring her RW when ambulating outside of her house and being unable to lift objects off the floor. She gives herself a 75 percent self reported improvement since starting therapy. Her best pain level is a 0/10 while her worst pain level is a 7/10. Problems/ barriers to goal attainment: none. Problem List: pain affecting function, decrease ROM, decrease strength, impaired gait/ balance, decrease activity tolerance, decrease flexibility/ joint mobility, and decrease transfer abilities    Treatment Plan: Therapeutic exercise, Therapeutic activities, Neuromuscular re-education, Physical agent/modality, Gait/balance training, Manual therapy, Aquatic therapy, Patient education, Self Care training, Functional mobility training, Home safety training, and Stair training     Patient Goal (s) has been updated and includes: \"I want to not be dependent on my walker when I go outside.  \"     Goals for this certification period to be accomplished in 5 weeks:  hort Term Goals: To be accomplished in 1 weeks:  Goal: Pt to be compliant with initial HEP to improve strength and functional mobility  Status at last note/certification: Established and reviewed with Pt  Current:Progressing:   Goal: Pt to negotiate pool stairs with no more than 1 UE support and with reciprocal pattern for improved functional mobility  Status at last note/certification: progressing Pt able to utilize reciprocal pattern during ascent and descent with B UEs on the hand rails  Goal: Pt to report < 2/10 pain at worst to increase ease with ADLs. Status at last note/certification: progressing Worst pain 7/10  Goal: Pt to report FOTO score of 38 to show improved function and quality of life. Status at last note/certification: progressin point FOTO score. Frequency / Duration: Patient to be seen 2 times per week for 5 weeks:    Assessment / Recommendations:Continued therapy recommenced to improve gait pattern and activity tolerance. Certification Period: 2022-2022    Jayro English, PT 2022 1:26 PM    ________________________________________________________________________  I certify that the above Therapy Services are being furnished while the patient is under my care. I agree with the treatment plan and certify that this therapy is necessary. [] I have read the above and request that my patient continue as recommended.   [] I have read the above report and request that my patient continue therapy with the following changes/special instructions: ______________________________________  [] I have read the above report and request that my patient be discharged from therapy    Physician's Signature:____________Date:_________TIME:________     Ai Henriquez MD  ** Signature, Date and Time must be completed for valid certification **    Please sign and return to [facility] - [address]  [phone] fax

## 2022-07-28 ENCOUNTER — HOSPITAL ENCOUNTER (OUTPATIENT)
Dept: PHYSICAL THERAPY | Age: 67
Discharge: HOME | End: 2022-07-28
Payer: MEDICARE

## 2022-07-28 PROCEDURE — 97113 AQUATIC THERAPY/EXERCISES: CPT

## 2022-08-02 ENCOUNTER — APPOINTMENT (OUTPATIENT)
Dept: PHYSICAL THERAPY | Age: 67
End: 2022-08-02
Payer: MEDICARE

## 2022-08-04 LAB — MAMMOGRAPHY, EXTERNAL: NORMAL

## 2022-08-09 ENCOUNTER — HOSPITAL ENCOUNTER (OUTPATIENT)
Dept: PHYSICAL THERAPY | Age: 67
Discharge: HOME | End: 2022-08-09
Payer: MEDICARE

## 2022-08-09 PROCEDURE — 97113 AQUATIC THERAPY/EXERCISES: CPT

## 2022-08-11 ENCOUNTER — HOSPITAL ENCOUNTER (OUTPATIENT)
Dept: PHYSICAL THERAPY | Age: 67
Discharge: HOME | End: 2022-08-11
Payer: MEDICARE

## 2022-08-11 PROCEDURE — 97113 AQUATIC THERAPY/EXERCISES: CPT

## 2022-08-16 ENCOUNTER — HOSPITAL ENCOUNTER (OUTPATIENT)
Dept: PHYSICAL THERAPY | Age: 67
Discharge: HOME | End: 2022-08-16
Payer: MEDICARE

## 2022-08-16 PROCEDURE — 97113 AQUATIC THERAPY/EXERCISES: CPT

## 2022-08-23 ENCOUNTER — HOSPITAL ENCOUNTER (OUTPATIENT)
Dept: PHYSICAL THERAPY | Age: 67
Discharge: HOME | End: 2022-08-23
Payer: MEDICARE

## 2022-08-23 PROCEDURE — 97113 AQUATIC THERAPY/EXERCISES: CPT

## 2022-08-25 ENCOUNTER — HOSPITAL ENCOUNTER (OUTPATIENT)
Dept: PHYSICAL THERAPY | Age: 67
Discharge: HOME | End: 2022-08-25
Payer: MEDICARE

## 2022-08-25 PROCEDURE — 97113 AQUATIC THERAPY/EXERCISES: CPT

## 2022-08-25 NOTE — PROGRESS NOTES
Physical Therapy Discharge Instructions      [facility] - [facility], [address] [place] [address]  [phone] fax    Patient: Ricardo Mcclendon  : 1955      Continue Home Exercise Program 1 time per day for 5 weeks, then decrease to 3 times per week      Continue with    [x] Ice  as needed 2 times per day     [] Heat           Follow up with MD:     [] Upon completion of therapy     [x] As needed      Recommendations:     [x]   Return to activity with home program    []   Return to activity with the following modifications:       [x]Post Rehab Program    []Join Independent aquatic program     []Return to/join local gym        Additional Comments: Good luck!!!           Lizzie Thompson, AICHA 2022 2:16 PM

## 2022-08-25 NOTE — PROGRESS NOTES
[facility] - [facility], [address] [place] [address]  [phone] fax    Discharge Summary    Patient name: Johan Murphy Start of Care: 2022   Referral source: Mari Roman MD : 1955   Medical/Treatment Diagnosis: Other low back pain [M54.59]  Spinal enthesopathy, site unspecified [M46.00]  Payor: [facility] / Plan: [employer] / Product Type: Managed Care Medicare /  Onset Date:2022     Prior Hospitalization: see medical history Provider#: [id number]   Medications: Verified on Patient Summary List    Comorbidities: Hx: arthritis, HTN (controlled), diabetes, hx of seizure (most recent seizure was last December), heart arrhythmia, Histerectomy, bunionectomy, Pt has a family hx Parkinson's  Prior Level of Function:Pt presents in w/c at time of evaluation as primary means of mobility, retired, lives in a one story home with partner    Visits from [facility] of Care: 15    Missed Visits: 1    Reporting Period : 2022 to   Goal: Pt to be compliant with initial HEP to improve strength and functional mobility  Status at last note/certification: Established and reviewed with Pt  Current: MET: Pt is regularly performing her HEP. (2022)  Goal: Pt to negotiate pool stairs with no more than 1 UE support and with reciprocal pattern for improved functional mobility  Status at last note/certification: progressing Pt able to utilize reciprocal pattern during ascent and descent with B UEs on the hand rails  Current: MET[de-identified] Pt able to utilize a reciprocal pattern during stair ascent/descent with 1 UE on the rails, when getting out of the pool. (2022)  Goal: Pt to report < 2/10 pain at worst to increase ease with ADLs.   Status at last note/certification: progressing Worst pain 7/10  Current: 7/10 pain on the left side of her body at night. (2022)  Goal: Pt to report FOTO score of 38 to show improved function and quality of life. Status at last note/certification: progressin point FOTO score  Current: MET: 49 point FOTO score. (2022)    Assessment/ Summary of Care: Pt has attended 15 sessions of skilled therapy and is confident in her ability to manage her condition on her own. She has achieved her FOTO score goal, and is progressing towards all other LTGs. Her functional goals include increased standing balance when ambulating and improved ease with performing sit to stands. Her functional deficits include inability to ambulate longer than 1/2 a mile and lifting heavy objects off the floor. Pt is still having increased pain on the left side of her body when sleeping. The increases in pain are described as a tightness on the entire left side of her body and occur every night. Her max pain level is a 7/10, when trying to sleep. Her best pain level is a 0/10 at rest. She expressed interest in the post- rehab pool program following discharge. Pt has received her final HEP and verbalized understanding of exercise performance, and frequency.      RECOMMENDATIONS:  [x]Discontinue therapy: [x]Patient has reached or is progressing toward set goals      []Patient is non-compliant or has abdicated      []Due to lack of appreciable progress towards set goals    Anmol Mcneil PTA 2022 1:47 PM

## 2022-08-30 ENCOUNTER — APPOINTMENT (OUTPATIENT)
Dept: PHYSICAL THERAPY | Age: 67
End: 2022-08-30
Payer: MEDICARE

## 2022-09-01 ENCOUNTER — APPOINTMENT (OUTPATIENT)
Dept: PHYSICAL THERAPY | Age: 67
End: 2022-09-01

## 2022-12-01 DIAGNOSIS — Z11.59 ENCOUNTER FOR HEPATITIS C SCREENING TEST FOR LOW RISK PATIENT: ICD-10-CM

## 2022-12-01 DIAGNOSIS — D64.9 ANEMIA, UNSPECIFIED TYPE: Primary | ICD-10-CM

## 2022-12-01 DIAGNOSIS — Z13.6 SCREENING, ISCHEMIC HEART DISEASE: ICD-10-CM

## 2023-01-31 ENCOUNTER — OFFICE VISIT (OUTPATIENT)
Dept: FAMILY MEDICINE CLINIC | Age: 68
End: 2023-01-31
Payer: MEDICARE

## 2023-01-31 ENCOUNTER — HOSPITAL ENCOUNTER (OUTPATIENT)
Dept: LAB | Age: 68
Discharge: HOME | End: 2023-01-31
Payer: MEDICARE

## 2023-01-31 VITALS
WEIGHT: 174.2 LBS | BODY MASS INDEX: 32.06 KG/M2 | HEART RATE: 78 BPM | DIASTOLIC BLOOD PRESSURE: 73 MMHG | TEMPERATURE: 97.3 F | OXYGEN SATURATION: 98 % | RESPIRATION RATE: 17 BRPM | SYSTOLIC BLOOD PRESSURE: 141 MMHG | HEIGHT: 62 IN

## 2023-01-31 DIAGNOSIS — Z86.59 HISTORY OF BIPOLAR DISORDER: ICD-10-CM

## 2023-01-31 DIAGNOSIS — E78.5 HYPERLIPIDEMIA LDL GOAL <70: ICD-10-CM

## 2023-01-31 DIAGNOSIS — Z86.59 HISTORY OF ANXIETY: ICD-10-CM

## 2023-01-31 DIAGNOSIS — Z86.59 HISTORY OF POSTTRAUMATIC STRESS DISORDER (PTSD): ICD-10-CM

## 2023-01-31 DIAGNOSIS — I10 PRIMARY HYPERTENSION: ICD-10-CM

## 2023-01-31 DIAGNOSIS — Z87.898 HISTORY OF INSOMNIA: ICD-10-CM

## 2023-01-31 DIAGNOSIS — E11.9 TYPE 2 DIABETES MELLITUS WITHOUT COMPLICATION, WITHOUT LONG-TERM CURRENT USE OF INSULIN (HCC): ICD-10-CM

## 2023-01-31 DIAGNOSIS — E11.9 TYPE 2 DIABETES MELLITUS WITHOUT COMPLICATION, WITHOUT LONG-TERM CURRENT USE OF INSULIN (HCC): Primary | ICD-10-CM

## 2023-01-31 PROBLEM — F33.9 MAJOR DEPRESSIVE DISORDER, RECURRENT, UNSPECIFIED (HCC): Status: ACTIVE | Noted: 2023-01-31

## 2023-01-31 LAB
ALBUMIN SERPL-MCNC: 4.4 G/DL (ref 3.4–5)
ALBUMIN/GLOB SERPL: 1.5 (ref 0.8–1.7)
ALP SERPL-CCNC: 103 U/L (ref 45–117)
ALT SERPL-CCNC: 47 U/L (ref 13–56)
ANION GAP SERPL CALC-SCNC: 4 MMOL/L (ref 3–18)
APPEARANCE UR: CLEAR
AST SERPL-CCNC: 28 U/L (ref 10–38)
BACTERIA URNS QL MICRO: NEGATIVE /HPF
BASOPHILS # BLD: 0 K/UL (ref 0–0.1)
BASOPHILS NFR BLD: 0 % (ref 0–2)
BILIRUB SERPL-MCNC: 0.2 MG/DL (ref 0.2–1)
BILIRUB UR QL: NEGATIVE
BUN SERPL-MCNC: 4 MG/DL (ref 7–18)
BUN/CREAT SERPL: 6 (ref 12–20)
CALCIUM SERPL-MCNC: 9.9 MG/DL (ref 8.5–10.1)
CHLORIDE SERPL-SCNC: 106 MMOL/L (ref 100–111)
CHOLEST SERPL-MCNC: 189 MG/DL
CO2 SERPL-SCNC: 29 MMOL/L (ref 21–32)
COLOR UR: YELLOW
CREAT SERPL-MCNC: 0.68 MG/DL (ref 0.6–1.3)
DIFFERENTIAL METHOD BLD: ABNORMAL
EOSINOPHIL # BLD: 0.1 K/UL (ref 0–0.4)
EOSINOPHIL NFR BLD: 1 % (ref 0–5)
EPITH CASTS URNS QL MICRO: NORMAL /LPF (ref 0–5)
ERYTHROCYTE [DISTWIDTH] IN BLOOD BY AUTOMATED COUNT: 16 % (ref 11.6–14.5)
EST. AVERAGE GLUCOSE BLD GHB EST-MCNC: 157 MG/DL
GLOBULIN SER CALC-MCNC: 3 G/DL (ref 2–4)
GLUCOSE SERPL-MCNC: 171 MG/DL (ref 74–99)
GLUCOSE UR STRIP.AUTO-MCNC: NEGATIVE MG/DL
HBA1C MFR BLD: 7.1 % (ref 4.2–5.6)
HCT VFR BLD AUTO: 40 % (ref 35–45)
HDLC SERPL-MCNC: 95 MG/DL (ref 40–60)
HDLC SERPL: 2 (ref 0–5)
HGB BLD-MCNC: 12.2 G/DL (ref 12–16)
HGB UR QL STRIP: NEGATIVE
IMM GRANULOCYTES # BLD AUTO: 0 K/UL (ref 0–0.04)
IMM GRANULOCYTES NFR BLD AUTO: 0 % (ref 0–0.5)
KETONES UR QL STRIP.AUTO: NEGATIVE MG/DL
LDLC SERPL CALC-MCNC: 72.8 MG/DL (ref 0–100)
LEUKOCYTE ESTERASE UR QL STRIP.AUTO: NEGATIVE
LIPID PROFILE,FLP: ABNORMAL
LYMPHOCYTES # BLD: 2.7 K/UL (ref 0.9–3.6)
LYMPHOCYTES NFR BLD: 31 % (ref 21–52)
MCH RBC QN AUTO: 22.3 PG (ref 24–34)
MCHC RBC AUTO-ENTMCNC: 30.5 G/DL (ref 31–37)
MCV RBC AUTO: 73.1 FL (ref 78–100)
MONOCYTES # BLD: 0.4 K/UL (ref 0.05–1.2)
MONOCYTES NFR BLD: 4 % (ref 3–10)
NEUTS SEG # BLD: 5.6 K/UL (ref 1.8–8)
NEUTS SEG NFR BLD: 64 % (ref 40–73)
NITRITE UR QL STRIP.AUTO: NEGATIVE
NRBC # BLD: 0 K/UL (ref 0–0.01)
NRBC BLD-RTO: 0 PER 100 WBC
PH UR STRIP: 7.5 (ref 5–8)
PLATELET # BLD AUTO: 321 K/UL (ref 135–420)
PMV BLD AUTO: 11.1 FL (ref 9.2–11.8)
POTASSIUM SERPL-SCNC: 4.3 MMOL/L (ref 3.5–5.5)
PROT SERPL-MCNC: 7.4 G/DL (ref 6.4–8.2)
PROT UR STRIP-MCNC: NEGATIVE MG/DL
RBC # BLD AUTO: 5.47 M/UL (ref 4.2–5.3)
RBC #/AREA URNS HPF: NORMAL /HPF (ref 0–5)
SODIUM SERPL-SCNC: 139 MMOL/L (ref 136–145)
SP GR UR REFRACTOMETRY: 1.01 (ref 1–1.03)
TRIGL SERPL-MCNC: 106 MG/DL (ref ?–150)
UROBILINOGEN UR QL STRIP.AUTO: 0.2 EU/DL (ref 0.2–1)
VLDLC SERPL CALC-MCNC: 21.2 MG/DL
WBC # BLD AUTO: 8.7 K/UL (ref 4.6–13.2)
WBC URNS QL MICRO: NORMAL /HPF (ref 0–4)

## 2023-01-31 PROCEDURE — G8417 CALC BMI ABV UP PARAM F/U: HCPCS | Performed by: NURSE PRACTITIONER

## 2023-01-31 PROCEDURE — G8536 NO DOC ELDER MAL SCRN: HCPCS | Performed by: NURSE PRACTITIONER

## 2023-01-31 PROCEDURE — 1101F PT FALLS ASSESS-DOCD LE1/YR: CPT | Performed by: NURSE PRACTITIONER

## 2023-01-31 PROCEDURE — 3017F COLORECTAL CA SCREEN DOC REV: CPT | Performed by: NURSE PRACTITIONER

## 2023-01-31 PROCEDURE — 3077F SYST BP >= 140 MM HG: CPT | Performed by: NURSE PRACTITIONER

## 2023-01-31 PROCEDURE — G8400 PT W/DXA NO RESULTS DOC: HCPCS | Performed by: NURSE PRACTITIONER

## 2023-01-31 PROCEDURE — 99204 OFFICE O/P NEW MOD 45 MIN: CPT | Performed by: NURSE PRACTITIONER

## 2023-01-31 PROCEDURE — 80053 COMPREHEN METABOLIC PANEL: CPT

## 2023-01-31 PROCEDURE — 81001 URINALYSIS AUTO W/SCOPE: CPT

## 2023-01-31 PROCEDURE — G8427 DOCREV CUR MEDS BY ELIG CLIN: HCPCS | Performed by: NURSE PRACTITIONER

## 2023-01-31 PROCEDURE — 83036 HEMOGLOBIN GLYCOSYLATED A1C: CPT

## 2023-01-31 PROCEDURE — 3046F HEMOGLOBIN A1C LEVEL >9.0%: CPT | Performed by: NURSE PRACTITIONER

## 2023-01-31 PROCEDURE — 3078F DIAST BP <80 MM HG: CPT | Performed by: NURSE PRACTITIONER

## 2023-01-31 PROCEDURE — 36415 COLL VENOUS BLD VENIPUNCTURE: CPT

## 2023-01-31 PROCEDURE — G8432 DEP SCR NOT DOC, RNG: HCPCS | Performed by: NURSE PRACTITIONER

## 2023-01-31 PROCEDURE — 2022F DILAT RTA XM EVC RTNOPTHY: CPT | Performed by: NURSE PRACTITIONER

## 2023-01-31 PROCEDURE — 85025 COMPLETE CBC W/AUTO DIFF WBC: CPT

## 2023-01-31 PROCEDURE — 1090F PRES/ABSN URINE INCON ASSESS: CPT | Performed by: NURSE PRACTITIONER

## 2023-01-31 PROCEDURE — G9899 SCRN MAM PERF RSLTS DOC: HCPCS | Performed by: NURSE PRACTITIONER

## 2023-01-31 PROCEDURE — 1123F ACP DISCUSS/DSCN MKR DOCD: CPT | Performed by: NURSE PRACTITIONER

## 2023-01-31 PROCEDURE — 80061 LIPID PANEL: CPT

## 2023-01-31 RX ORDER — VERAPAMIL HYDROCHLORIDE 180 MG/1
180 CAPSULE, EXTENDED RELEASE ORAL
COMMUNITY
End: 2023-01-31 | Stop reason: SDUPTHER

## 2023-01-31 RX ORDER — VERAPAMIL HYDROCHLORIDE 180 MG/1
180 CAPSULE, EXTENDED RELEASE ORAL
Qty: 90 CAPSULE | Refills: 1 | Status: SHIPPED | OUTPATIENT
Start: 2023-01-31

## 2023-01-31 RX ORDER — LISINOPRIL 20 MG/1
20 TABLET ORAL DAILY
Qty: 90 TABLET | Refills: 1 | Status: SHIPPED | OUTPATIENT
Start: 2023-01-31

## 2023-01-31 RX ORDER — ATORVASTATIN CALCIUM 20 MG/1
20 TABLET, FILM COATED ORAL
COMMUNITY
End: 2023-01-31 | Stop reason: SDUPTHER

## 2023-01-31 RX ORDER — MULTIVITAMIN
1 TABLET ORAL DAILY
COMMUNITY

## 2023-01-31 RX ORDER — DULAGLUTIDE 0.75 MG/.5ML
0.75 INJECTION, SOLUTION SUBCUTANEOUS
Qty: 3 EACH | Refills: 1 | Status: SHIPPED | OUTPATIENT
Start: 2023-01-31

## 2023-01-31 RX ORDER — TRAZODONE HYDROCHLORIDE 50 MG/1
50 TABLET ORAL
Qty: 90 TABLET | Refills: 1 | Status: SHIPPED | OUTPATIENT
Start: 2023-01-31

## 2023-01-31 RX ORDER — GABAPENTIN 400 MG/1
400 CAPSULE ORAL 3 TIMES DAILY
COMMUNITY

## 2023-01-31 RX ORDER — IBUPROFEN 100 MG/5ML
1000 SUSPENSION, ORAL (FINAL DOSE FORM) ORAL DAILY
COMMUNITY

## 2023-01-31 RX ORDER — CHOLECALCIFEROL (VITAMIN D3) 125 MCG
2000 CAPSULE ORAL DAILY
COMMUNITY

## 2023-01-31 RX ORDER — ATORVASTATIN CALCIUM 20 MG/1
20 TABLET, FILM COATED ORAL
Qty: 90 TABLET | Refills: 1 | Status: SHIPPED | OUTPATIENT
Start: 2023-01-31

## 2023-01-31 RX ORDER — ALPHA LIPOIC ACID 300 MG
300 CAPSULE ORAL 3 TIMES DAILY
COMMUNITY

## 2023-01-31 RX ORDER — LISINOPRIL 20 MG/1
20 TABLET ORAL DAILY
COMMUNITY
End: 2023-01-31 | Stop reason: SDUPTHER

## 2023-01-31 RX ORDER — DOXEPIN HYDROCHLORIDE 50 MG/1
50 CAPSULE ORAL
Qty: 90 CAPSULE | Refills: 1 | Status: SHIPPED | OUTPATIENT
Start: 2023-01-31

## 2023-01-31 RX ORDER — DOXEPIN HYDROCHLORIDE 50 MG/1
CAPSULE ORAL
COMMUNITY
End: 2023-01-31 | Stop reason: SDUPTHER
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
## 2023-01-31 NOTE — PROGRESS NOTES
1. \"Have you been to the ER, urgent care clinic since your last visit? Hospitalized since your last visit? \" Yes ,12/24/22 for med refill    2. \"Have you seen or consulted any other health care providers outside of the [address] [place] [address] since your last visit? \" Yes , Dr Angelica Shi neurology for spinal stenosis,       3. For patients aged 39-70: Has the patient had a colonoscopy / FIT/ Cologuard? Yes - no Care Gap present      If the patient is female:    4. For patients aged 41-77: Has the patient had a mammogram within the past 2 years? Yes - Care Gap present. Most recent result on file      5. For patients aged 21-65: Has the patient had a pap smear?  NA - based on age or sex

## 2023-05-24 RX ORDER — LISINOPRIL 20 MG/1
20 TABLET ORAL DAILY
COMMUNITY
Start: 2023-03-14

## 2023-05-24 RX ORDER — BRIVARACETAM 75 MG/1
1 TABLET, FILM COATED ORAL 2 TIMES DAILY
COMMUNITY
Start: 2023-03-22

## 2023-05-24 RX ORDER — DOXEPIN HYDROCHLORIDE 50 MG/1
50 CAPSULE ORAL NIGHTLY
COMMUNITY
Start: 2023-04-29

## 2023-05-24 RX ORDER — GABAPENTIN 400 MG/1
CAPSULE ORAL
COMMUNITY
Start: 2023-04-29

## 2023-05-24 RX ORDER — ATORVASTATIN CALCIUM 20 MG/1
TABLET, FILM COATED ORAL
COMMUNITY
Start: 2023-03-14

## 2023-05-24 RX ORDER — BLOOD SUGAR DIAGNOSTIC
STRIP MISCELLANEOUS
COMMUNITY
Start: 2023-03-14

## 2023-05-30 RX ORDER — DULAGLUTIDE 0.75 MG/.5ML
0.75 INJECTION, SOLUTION SUBCUTANEOUS
Qty: 4 ADJUSTABLE DOSE PRE-FILLED PEN SYRINGE | Refills: 1 | Status: SHIPPED | OUTPATIENT
Start: 2023-05-30

## 2023-05-30 RX ORDER — BRIVARACETAM 75 MG/1
1 TABLET, FILM COATED ORAL 2 TIMES DAILY
Qty: 60 TABLET | OUTPATIENT
Start: 2023-05-30

## 2023-06-05 ENCOUNTER — TELEPHONE (OUTPATIENT)
Age: 68
End: 2023-06-05

## 2023-08-07 DIAGNOSIS — E11.9 TYPE 2 DIABETES MELLITUS WITHOUT COMPLICATION, WITHOUT LONG-TERM CURRENT USE OF INSULIN (HCC): Primary | ICD-10-CM

## 2023-08-07 RX ORDER — DULAGLUTIDE 0.75 MG/.5ML
INJECTION, SOLUTION SUBCUTANEOUS
Qty: 2 ML | OUTPATIENT
Start: 2023-08-07

## 2023-08-07 NOTE — TELEPHONE ENCOUNTER
Last Visit: 01- OV   Next Appointment: none  Previous Refill Encounter: 05- #4 adjustable dose pre-filled pen syringe with 1 refills      Requested Prescriptions     Pending Prescriptions Disp Refills    [facility] [Pharmacy Med Name: [facility] 8.66YS/3.0JF SDP 0.5ML] 2 mL      Sig: INJECT 0.75MG SUBCUTANEOUS ONE DAY A WEEK

## 2024-04-17 RX ORDER — DULAGLUTIDE 0.75 MG/.5ML
INJECTION, SOLUTION SUBCUTANEOUS
Qty: 6 ML | Refills: 1 | OUTPATIENT
Start: 2024-04-17